# Patient Record
Sex: FEMALE | Race: WHITE | NOT HISPANIC OR LATINO | Employment: OTHER | URBAN - NONMETROPOLITAN AREA
[De-identification: names, ages, dates, MRNs, and addresses within clinical notes are randomized per-mention and may not be internally consistent; named-entity substitution may affect disease eponyms.]

---

## 2017-03-08 ENCOUNTER — TRANSCRIBE ORDERS (OUTPATIENT)
Dept: ADMINISTRATIVE | Facility: HOSPITAL | Age: 67
End: 2017-03-08

## 2017-03-08 DIAGNOSIS — Z12.31 ENCOUNTER FOR SCREENING MAMMOGRAM FOR MALIGNANT NEOPLASM OF BREAST: Primary | ICD-10-CM

## 2017-03-21 ENCOUNTER — HOSPITAL ENCOUNTER (OUTPATIENT)
Dept: MAMMOGRAPHY | Facility: HOSPITAL | Age: 67
Discharge: HOME | End: 2017-03-21
Attending: INTERNAL MEDICINE | Admitting: INTERNAL MEDICINE

## 2017-03-21 DIAGNOSIS — Z12.31 ENCOUNTER FOR SCREENING MAMMOGRAM FOR MALIGNANT NEOPLASM OF BREAST: ICD-10-CM

## 2017-03-21 PROCEDURE — 77063 BREAST TOMOSYNTHESIS BI: CPT

## 2017-03-21 PROCEDURE — G0202 SCR MAMMO BI INCL CAD: HCPCS

## 2018-01-23 ENCOUNTER — OFFICE VISIT (OUTPATIENT)
Dept: GASTROENTEROLOGY | Facility: CLINIC | Age: 68
End: 2018-01-23

## 2018-01-23 VITALS
HEART RATE: 74 BPM | OXYGEN SATURATION: 99 % | WEIGHT: 169 LBS | TEMPERATURE: 96.6 F | DIASTOLIC BLOOD PRESSURE: 88 MMHG | BODY MASS INDEX: 31.1 KG/M2 | HEIGHT: 62 IN | SYSTOLIC BLOOD PRESSURE: 174 MMHG

## 2018-01-23 DIAGNOSIS — Z80.0 FAMILY HISTORY OF COLON CANCER: ICD-10-CM

## 2018-01-23 DIAGNOSIS — R19.5 POSITIVE FIT (FECAL IMMUNOCHEMICAL TEST): ICD-10-CM

## 2018-01-23 DIAGNOSIS — R13.19 OTHER DYSPHAGIA: Primary | ICD-10-CM

## 2018-01-23 DIAGNOSIS — K21.9 GASTROESOPHAGEAL REFLUX DISEASE, ESOPHAGITIS PRESENCE NOT SPECIFIED: ICD-10-CM

## 2018-01-23 PROCEDURE — 99204 OFFICE O/P NEW MOD 45 MIN: CPT | Performed by: NURSE PRACTITIONER

## 2018-01-23 RX ORDER — AMITRIPTYLINE HYDROCHLORIDE 100 MG/1
TABLET, FILM COATED ORAL
COMMUNITY
Start: 2018-01-17

## 2018-01-23 RX ORDER — OMEPRAZOLE 20 MG/1
CAPSULE, DELAYED RELEASE ORAL
COMMUNITY
Start: 2017-11-25 | End: 2021-08-18

## 2018-01-23 RX ORDER — DIPHENHYDRAMINE HYDROCHLORIDE 25 MG/1
TABLET ORAL
COMMUNITY
End: 2021-08-18

## 2018-01-23 RX ORDER — FERROUS SULFATE 325(65) MG
325 TABLET ORAL
COMMUNITY
End: 2021-08-18

## 2018-01-23 RX ORDER — FENOFIBRATE 145 MG/1
TABLET, COATED ORAL
COMMUNITY
Start: 2017-11-25 | End: 2022-10-07

## 2018-01-23 RX ORDER — LABETALOL 300 MG/1
TABLET, FILM COATED ORAL
COMMUNITY
Start: 2017-10-21 | End: 2022-10-07

## 2018-01-23 RX ORDER — SODIUM, POTASSIUM,MAG SULFATES 17.5-3.13G
SOLUTION, RECONSTITUTED, ORAL ORAL
Qty: 1 BOTTLE | Refills: 0 | Status: SHIPPED | OUTPATIENT
Start: 2018-01-23 | End: 2018-03-07 | Stop reason: HOSPADM

## 2018-01-23 RX ORDER — ESTRADIOL 2 MG/1
2 TABLET ORAL
COMMUNITY
End: 2021-08-18

## 2018-01-23 RX ORDER — LEVOTHYROXINE SODIUM 88 UG/1
TABLET ORAL
COMMUNITY
Start: 2018-01-15

## 2018-01-23 RX ORDER — LOSARTAN POTASSIUM 100 MG/1
TABLET ORAL
COMMUNITY
Start: 2017-11-25 | End: 2021-08-18

## 2018-01-23 NOTE — PROGRESS NOTES
Chief Complaint   Patient presents with   • GI Problem     Patient is here today for a possitive FIT test. She states that sometimes she feels like her food is getting stuck       Subjective     HPI    Feeling of solid food becoming stuck in mid esophagus.  Taking a drink of water relieves sx or regurgitation of food.  She is unable to tell me how often or how long sx has been presnet.  Reports she experiences heartburn daily despite the use of bid Prilosec.      Positive FIT test through insurance.  No abdominal pain.  Increased diarrhea x 2 yrs since cholecystectomy.  No BRBPR or melena.  No weight loss.  Pos family hx of CRC in sister, dx age of 51.  No personal hx of colon polyps    CScope (Dr Last) 2013 normal  CScope (Dr Last) 2009 normal  CScope (Dr Last) 2006    Endoscopy (Dr Last) 2013-normal, neg celiac bx      Past Medical History:   Diagnosis Date   • DM (diabetes mellitus)    • GERD (gastroesophageal reflux disease)    • HTN (hypertension)    • Hypercholesteremia    • Hypothyroidism    • Osteopenia        Past Surgical History:   Procedure Laterality Date   • CARPAL TUNNEL RELEASE     • CHOLECYSTECTOMY     • COLONOSCOPY  11/08/2013   • TOTAL ABDOMINAL HYSTERECTOMY WITH SALPINGO OOPHORECTOMY     • UPPER GASTROINTESTINAL ENDOSCOPY  11/08/2013       Outpatient Prescriptions Marked as Taking for the 1/23/18 encounter (Office Visit) with ABRB Astudillo   Medication Sig Dispense Refill   • amitriptyline (ELAVIL) 100 MG tablet      • aspirin 81 MG tablet Take 81 mg by mouth.     • Biotin (BIOTIN 5000) 5 MG capsule Take  by mouth.     • CINNAMON PO Take  by mouth.     • estradiol (ESTRACE) 2 MG tablet Take 2 mg by mouth.     • fenofibrate (TRICOR) 145 MG tablet      • ferrous sulfate 325 (65 FE) MG tablet Take 325 mg by mouth Daily With Breakfast.     • Gelatin 600 MG capsule Take  by mouth.     • labetalol (NORMODYNE) 300 MG tablet      • levothyroxine (SYNTHROID, LEVOTHROID) 88 MCG tablet      •  "losartan (COZAAR) 100 MG tablet      • metFORMIN (GLUCOPHAGE) 1000 MG tablet      • omeprazole (priLOSEC) 20 MG capsule          No Known Allergies    Social History     Social History   • Marital status:      Spouse name: N/A   • Number of children: N/A   • Years of education: N/A     Occupational History   • Not on file.     Social History Main Topics   • Smoking status: Never Smoker   • Smokeless tobacco: Never Used   • Alcohol use No   • Drug use: Not on file   • Sexual activity: Not on file     Other Topics Concern   • Not on file     Social History Narrative       Family History   Problem Relation Age of Onset   • Breast cancer Maternal Aunt    • Colon cancer Sister        Review of Systems   Constitutional: Negative for fatigue, fever and unexpected weight change.   HENT: Negative for hearing loss, sore throat and voice change.    Eyes: Negative for visual disturbance.   Respiratory: Negative for cough, shortness of breath and wheezing.    Cardiovascular: Negative for chest pain and palpitations.   Gastrointestinal: Negative for abdominal pain, blood in stool and vomiting.   Endocrine: Negative for polydipsia and polyuria.   Genitourinary: Negative for difficulty urinating, dysuria, hematuria and urgency.   Musculoskeletal: Negative for joint swelling and myalgias.   Skin: Negative for color change, rash and wound.   Neurological: Negative for dizziness, tremors, seizures and syncope.   Hematological: Does not bruise/bleed easily.   Psychiatric/Behavioral: Negative for agitation and confusion. The patient is not nervous/anxious.        Objective     Vitals:    01/23/18 1247   BP: 174/88   Pulse: 74   Temp: 96.6 °F (35.9 °C)   SpO2: 99%   Weight: 76.7 kg (169 lb)   Height: 157.5 cm (62\")     Body mass index is 30.91 kg/(m^2).    Physical Exam   Constitutional: She is oriented to person, place, and time. She appears well-developed and well-nourished.   HENT:   Head: Normocephalic and atraumatic.   Eyes: " Conjunctivae are normal. Pupils are equal, round, and reactive to light. No scleral icterus.   Neck: No JVD present. No thyroid mass and no thyromegaly present.   Cardiovascular: Normal rate, regular rhythm and normal heart sounds.  Exam reveals no gallop and no friction rub.    No murmur heard.  Pulmonary/Chest: Effort normal and breath sounds normal. No accessory muscle usage. No respiratory distress. She has no wheezes. She has no rales.   Abdominal: Soft. Bowel sounds are normal. She exhibits no distension, no ascites and no mass. There is no splenomegaly or hepatomegaly. There is no tenderness. There is no rebound and no guarding.   Genitourinary:   Genitourinary Comments: Rectal-Did not examine   Musculoskeletal: Normal range of motion. She exhibits no edema.   Neurological: She is alert and oriented to person, place, and time.   Deemed a reliable historian, able to converse without difficulty and able to move all extremities without difficulty   Skin: Skin is warm and dry.   Psychiatric: She has a normal mood and affect. Her behavior is normal.       Imaging Results (most recent)     None          Assessment/Plan     Kathryn was seen today for gi problem.    Diagnoses and all orders for this visit:    Other dysphagia  -     Case Request; Standing  -     Implement Anesthesia Orders Day of Procedure; Standing  -     Obtain Informed Consent; Standing  -     Case Request    Gastroesophageal reflux disease, esophagitis presence not specified    Positive FIT (fecal immunochemical test)  -     sodium-potassium-magnesium sulfates (SUPREP BOWEL PREP KIT) 17.5-3.13-1.6 GM/180ML solution oral solution; Take as directed  -     Case Request; Standing  -     Implement Anesthesia Orders Day of Procedure; Standing  -     Obtain Informed Consent; Standing  -     Verify bowel prep was successful; Standing  -     Give tap water enema if bowel prep was insufficient; Standing  -     Give Fleet's enema for intolerance of bowel  prep; Standing  -     Case Request    Family history of colon cancer  Comments:  sister        COLONOSCOPY WITH ANESTHESIA (N/A), ESOPHAGOGASTRODUODENOSCOPY WITH ANESTHESIA (N/A)     Patient is to continue all blood pressure and cardiac medications prior to procedure.     Advised pt to stop ASA day prior to procedure and to stop use of NSAIDs, Fish Oil, and MV 5 days prior to procedure.  Tylenol based products are ok to take.  Pt verbalized understanding.    The risk of the endoscopy were discussed in detail.  We discussed the risk of perforation (one out of 3686-2438, riskier with dilation), bleeding (one out of 500), and the rare risks of infection, adverse reaction to anesthesia, respiratory failure, cardiac failure including MI and adverse reaction to medications, etc.  We discussed consequences that could occur if a risk were to develop such as the need for hospitalization, blood transfusion, surgical intervention, medications, pain and disability and death.  Alternatives include not doing anything, or pursuing an UGI series which only offers a diagnosis with potential less accuracy compared to egd.  The patient verbalizes understanding and agrees to proceed.    All risks, benefits, alternatives, and indications of colonoscopy procedure have been discussed with the patient. Risks to include perforation of the colon requiring possible surgery or colostomy, risk of bleeding from biopsies or removal of colon tissue, possibility of missing a colon polyp or cancer, or adverse drug reaction.  Benefits to include the diagnosis and management of disease of the colon and rectum. Alternatives to include barium enema, radiographic evaluation, lab testing or no intervention. Pt verbalizes understanding and agrees to proceed with procedure.        There are no Patient Instructions on file for this visit.

## 2018-03-07 ENCOUNTER — TELEPHONE (OUTPATIENT)
Dept: GASTROENTEROLOGY | Facility: CLINIC | Age: 68
End: 2018-03-07

## 2018-03-07 ENCOUNTER — HOSPITAL ENCOUNTER (OUTPATIENT)
Facility: HOSPITAL | Age: 68
Setting detail: HOSPITAL OUTPATIENT SURGERY
Discharge: HOME | End: 2018-03-07
Attending: INTERNAL MEDICINE | Admitting: INTERNAL MEDICINE

## 2018-03-07 ENCOUNTER — ANESTHESIA (OUTPATIENT)
Dept: GASTROENTEROLOGY | Facility: HOSPITAL | Age: 68
End: 2018-03-07

## 2018-03-07 ENCOUNTER — ANESTHESIA EVENT (OUTPATIENT)
Dept: GASTROENTEROLOGY | Facility: HOSPITAL | Age: 68
End: 2018-03-07

## 2018-03-07 VITALS
RESPIRATION RATE: 20 BRPM | BODY MASS INDEX: 30.55 KG/M2 | WEIGHT: 166 LBS | SYSTOLIC BLOOD PRESSURE: 163 MMHG | DIASTOLIC BLOOD PRESSURE: 84 MMHG | OXYGEN SATURATION: 98 % | HEART RATE: 86 BPM | HEIGHT: 62 IN | TEMPERATURE: 97.2 F

## 2018-03-07 DIAGNOSIS — R19.5 POSITIVE FIT (FECAL IMMUNOCHEMICAL TEST): ICD-10-CM

## 2018-03-07 DIAGNOSIS — R13.19 OTHER DYSPHAGIA: ICD-10-CM

## 2018-03-07 PROBLEM — Z80.0 FAMILY HISTORY OF COLON CANCER: Status: ACTIVE | Noted: 2018-03-07

## 2018-03-07 LAB — GLUCOSE BLDC GLUCOMTR-MCNC: 179 MG/DL (ref 70–130)

## 2018-03-07 PROCEDURE — 43249 ESOPH EGD DILATION <30 MM: CPT | Performed by: INTERNAL MEDICINE

## 2018-03-07 PROCEDURE — 45378 DIAGNOSTIC COLONOSCOPY: CPT | Performed by: INTERNAL MEDICINE

## 2018-03-07 PROCEDURE — 82962 GLUCOSE BLOOD TEST: CPT

## 2018-03-07 PROCEDURE — C1726 CATH, BAL DIL, NON-VASCULAR: HCPCS | Performed by: INTERNAL MEDICINE

## 2018-03-07 PROCEDURE — 25010000002 PROPOFOL 10 MG/ML EMULSION: Performed by: NURSE ANESTHETIST, CERTIFIED REGISTERED

## 2018-03-07 RX ORDER — SODIUM CHLORIDE 9 MG/ML
500 INJECTION, SOLUTION INTRAVENOUS CONTINUOUS PRN
Status: DISCONTINUED | OUTPATIENT
Start: 2018-03-07 | End: 2018-03-07 | Stop reason: HOSPADM

## 2018-03-07 RX ORDER — LIDOCAINE HYDROCHLORIDE 20 MG/ML
INJECTION, SOLUTION INFILTRATION; PERINEURAL AS NEEDED
Status: DISCONTINUED | OUTPATIENT
Start: 2018-03-07 | End: 2018-03-07 | Stop reason: SURG

## 2018-03-07 RX ORDER — SODIUM CHLORIDE 0.9 % (FLUSH) 0.9 %
3 SYRINGE (ML) INJECTION AS NEEDED
Status: DISCONTINUED | OUTPATIENT
Start: 2018-03-07 | End: 2018-03-07 | Stop reason: HOSPADM

## 2018-03-07 RX ORDER — PROPOFOL 10 MG/ML
VIAL (ML) INTRAVENOUS AS NEEDED
Status: DISCONTINUED | OUTPATIENT
Start: 2018-03-07 | End: 2018-03-07 | Stop reason: SURG

## 2018-03-07 RX ADMIN — PROPOFOL 30 MG: 10 INJECTION, EMULSION INTRAVENOUS at 07:55

## 2018-03-07 RX ADMIN — PROPOFOL 30 MG: 10 INJECTION, EMULSION INTRAVENOUS at 07:56

## 2018-03-07 RX ADMIN — PROPOFOL 30 MG: 10 INJECTION, EMULSION INTRAVENOUS at 08:05

## 2018-03-07 RX ADMIN — PROPOFOL 30 MG: 10 INJECTION, EMULSION INTRAVENOUS at 07:54

## 2018-03-07 RX ADMIN — PROPOFOL 30 MG: 10 INJECTION, EMULSION INTRAVENOUS at 07:52

## 2018-03-07 RX ADMIN — PROPOFOL 30 MG: 10 INJECTION, EMULSION INTRAVENOUS at 08:08

## 2018-03-07 RX ADMIN — PROPOFOL 50 MG: 10 INJECTION, EMULSION INTRAVENOUS at 07:45

## 2018-03-07 RX ADMIN — SODIUM CHLORIDE 500 ML: 9 INJECTION, SOLUTION INTRAVENOUS at 07:17

## 2018-03-07 RX ADMIN — PROPOFOL 30 MG: 10 INJECTION, EMULSION INTRAVENOUS at 07:46

## 2018-03-07 RX ADMIN — PROPOFOL 30 MG: 10 INJECTION, EMULSION INTRAVENOUS at 08:00

## 2018-03-07 RX ADMIN — PROPOFOL 30 MG: 10 INJECTION, EMULSION INTRAVENOUS at 08:03

## 2018-03-07 RX ADMIN — LIDOCAINE HYDROCHLORIDE 50 MG: 20 INJECTION, SOLUTION INFILTRATION; PERINEURAL at 07:45

## 2018-03-07 RX ADMIN — SODIUM CHLORIDE: 9 INJECTION, SOLUTION INTRAVENOUS at 08:03

## 2018-03-07 NOTE — ANESTHESIA POSTPROCEDURE EVALUATION
Patient: Kathryn Wray    Procedure Summary     Date Anesthesia Start Anesthesia Stop Room / Location    03/07/18 0742 [id number] [facility] ENDOSCOPY 5 / [facility]       Procedure Diagnosis Surgeon Provider    COLONOSCOPY WITH ANESTHESIA (N/A ); ESOPHAGOGASTRODUODENOSCOPY WITH ANESTHESIA (N/A Esophagus) Other dysphagia; Positive FIT (fecal immunochemical test)  (Other dysphagia [R13.19]; Positive FIT (fecal immunochemical test) [R19.5]) MD Africa Cordon CRNA          Anesthesia Type: general  Last vitals  BP   163/89 (03/07/18 0655)   Temp   97.2 °F (36.2 °C) (03/07/18 0655)   Pulse   94 (03/07/18 0655)   Resp   20 (03/07/18 0655)     SpO2   98 % (03/07/18 0655)     Post Anesthesia Care and Evaluation    Patient location during evaluation: PHASE II  Patient participation: complete - patient participated  Level of consciousness: awake and alert  Pain score: 0  Pain management: adequate  Airway patency: patent  Anesthetic complications: No anesthetic complications    Cardiovascular status: acceptable and stable  Respiratory status: acceptable and unassisted  Hydration status: stable

## 2018-03-07 NOTE — H&P
Chief Complaint:   Dysphagia and fit positive stool    Subjective     HPI:   Patient is having complaints of dysphagia.  She also was found to have fit positive stool.  Sister had colon cancer age 51.  Patient's last colonoscopy was November 2013.    Past Medical History:   Past Medical History:   Diagnosis Date   • Arthritis    • DM (diabetes mellitus)    • GERD (gastroesophageal reflux disease)    • HTN (hypertension)    • Hypercholesteremia    • Hypothyroidism    • Osteopenia        Past Surgical History:  Past Surgical History:   Procedure Laterality Date   • CARPAL TUNNEL RELEASE     • CHOLECYSTECTOMY     • COLONOSCOPY  11/08/2013   • TOTAL ABDOMINAL HYSTERECTOMY WITH SALPINGO OOPHORECTOMY     • UPPER GASTROINTESTINAL ENDOSCOPY  11/08/2013       Family History:  Family History   Problem Relation Age of Onset   • Breast cancer Maternal Aunt    • Colon cancer Sister        Social History:   reports that she has never smoked. She has never used smokeless tobacco. She reports that she does not drink alcohol.    Medications:   Prescriptions Prior to Admission   Medication Sig Dispense Refill Last Dose   • estradiol (ESTRACE) 2 MG tablet Take 2 mg by mouth.   3/6/2018 at Unknown time   • labetalol (NORMODYNE) 300 MG tablet    3/6/2018 at 1600   • levothyroxine (SYNTHROID, LEVOTHROID) 88 MCG tablet    3/6/2018 at Unknown time   • losartan (COZAAR) 100 MG tablet    3/6/2018 at Unknown time   • omeprazole (priLOSEC) 20 MG capsule    3/6/2018 at Unknown time   • amitriptyline (ELAVIL) 100 MG tablet    3/5/2018   • aspirin 81 MG tablet Take 81 mg by mouth.   3/5/2018   • Biotin (BIOTIN 5000) 5 MG capsule Take  by mouth.   More than a month at Unknown time   • CINNAMON PO Take  by mouth.   3/5/2018   • fenofibrate (TRICOR) 145 MG tablet    3/5/2018   • ferrous sulfate 325 (65 FE) MG tablet Take 325 mg by mouth Daily With Breakfast.   2/28/2018   • Gelatin 600 MG capsule Take  by mouth.   3/5/2018   • metFORMIN  "(GLUCOPHAGE) 1000 MG tablet    3/6/2018   • sodium-potassium-magnesium sulfates (SUPREP BOWEL PREP KIT) 17.5-3.13-1.6 GM/180ML solution oral solution Take as directed 1 bottle 0        Allergies:  Review of patient's allergies indicates no known allergies.    ROS:    General: Weight stable  Resp: No SOA  Cardiovascular: No CP      Objective     /89 (Patient Position: Sitting)  Pulse 94  Temp 97.2 °F (36.2 °C) (Temporal Artery )   Resp 20  Ht 157.5 cm (62\")  Wt 75.3 kg (166 lb)  SpO2 98%  BMI 30.36 kg/m2    Physical Exam   Constitutional: Pt is oriented to person, place, and in no distress.  Eyes: No icterus  ENMT: Unremarkable   Cardiovascular: Normal rate, regular rhythm.    Pulmonary/Chest: No distress.  No audible wheezes  Abdominal: Soft.   Skin: Skin is warm and dry.   Psychiatric: Mood, memory, affect and judgment appear normal.     Assessment/Plan     Diagnosis:  Dysphagia  Fit positive  Family history colon cancer in first-degree relative under the age of 60    Anticipated Surgical Procedure:  Endoscopy and colonoscopy    The risks, benefits, and alternatives of endoscopy were reviewed with the patient today.  Risks including perforation, with or without dilation, possibly requiring surgery.  Additional risks include risk of bleeding.  There is also the risk of a drug reaction or problems with anesthesia.  This will be discussed with the further by the anesthesia team on the day of the procedure. The benefits include the diagnosis and management of disease of the upper digestive tract.  Alternatives to endoscopy include upper GI series, laboratory testing, radiographic evaluation, or no intervention.  The patient verbalizes understanding and agrees.    The risks, benefits, and alternatives of colonoscopy were reviewed with the patient today.  Risks including perforation of the colon possibly requiring surgery or colostomy.  Additional risks include risk of bleeding from biopsies or removal of " colon tissue.  There is also the risk of a drug reaction or problems with anesthesia.  This will be discussed with the further by the anesthesia team on the day of the procedure.  Lastly there is a possibility of missing a colon polyp or cancer.  The benefits include the diagnosis and management of disease of the colon and rectum.  Alternatives to colonoscopy include barium enema, laboratory testing, radiographic evaluation, or no intervention.  The patient verbalizes understanding and agrees.    Much of this encounter note is an electronic transcription/translation of spoken language to printed text. The electronic translation of spoken language may permit erroneous, or at times, nonsensical words or phrases to be inadvertently transcribed; although I have reviewed the note for such errors, some may still exist.

## 2018-03-07 NOTE — PLAN OF CARE
Problem: Patient Care Overview (Adult)  Goal: Plan of Care Review  Outcome: Outcome(s) achieved Date Met: 03/07/18

## 2018-03-07 NOTE — PLAN OF CARE
Problem: Patient Care Overview (Adult)  Goal: Plan of Care Review  Outcome: Ongoing (interventions implemented as appropriate)   03/07/18 0803   Coping/Psychosocial Response Interventions   Plan Of Care Reviewed With patient   Patient Care Overview   Progress no change   Outcome Evaluation   Outcome Summary/Follow up Plan tolerating well       Problem: GI Endoscopy (Adult)  Goal: Signs and Symptoms of Listed Potential Problems Will be Absent or Manageable (GI Endoscopy)  Outcome: Ongoing (interventions implemented as appropriate)

## 2018-03-07 NOTE — ANESTHESIA PREPROCEDURE EVALUATION
Anesthesia Evaluation     Patient summary reviewed and Nursing notes reviewed   no history of anesthetic complications:  NPO Solid Status: > 8 hours  NPO Liquid Status: > 2 hours           Airway   Mallampati: II  TM distance: >3 FB  Neck ROM: full  Possible difficult intubation and Small opening  Dental      Pulmonary    (-) asthma, sleep apnea, not a smoker  Cardiovascular   Exercise tolerance: good (4-7 METS)    Patient on routine beta blocker and Beta blocker given within 24 hours of surgery    (+) hypertension, hyperlipidemia,       Neuro/Psych  (-) seizures, TIA, CVA  GI/Hepatic/Renal/Endo    (+) obesity,  GERD,  diabetes mellitus, hypothyroidism,   (-) liver disease, no renal disease    Musculoskeletal     Abdominal    Substance History      OB/GYN          Other   (+) arthritis                   Anesthesia Plan    ASA 2     general     intravenous induction   Anesthetic plan and risks discussed with patient.

## 2018-03-07 NOTE — PLAN OF CARE
Problem: GI Endoscopy (Adult)  Goal: Signs and Symptoms of Listed Potential Problems Will be Absent or Manageable (GI Endoscopy)  Outcome: Outcome(s) achieved Date Met: 03/07/18

## 2018-04-26 ENCOUNTER — TRANSCRIBE ORDERS (OUTPATIENT)
Dept: ADMINISTRATIVE | Facility: HOSPITAL | Age: 68
End: 2018-04-26

## 2018-04-26 DIAGNOSIS — Z12.31 ENCOUNTER FOR SCREENING MAMMOGRAM FOR MALIGNANT NEOPLASM OF BREAST: Primary | ICD-10-CM

## 2018-05-09 ENCOUNTER — HOSPITAL ENCOUNTER (OUTPATIENT)
Dept: MAMMOGRAPHY | Facility: HOSPITAL | Age: 68
Discharge: HOME | End: 2018-05-09
Attending: INTERNAL MEDICINE | Admitting: INTERNAL MEDICINE

## 2018-05-09 DIAGNOSIS — Z12.31 ENCOUNTER FOR SCREENING MAMMOGRAM FOR MALIGNANT NEOPLASM OF BREAST: ICD-10-CM

## 2018-05-09 PROCEDURE — 77063 BREAST TOMOSYNTHESIS BI: CPT

## 2018-05-09 PROCEDURE — 77067 SCR MAMMO BI INCL CAD: CPT

## 2018-05-16 ENCOUNTER — TRANSCRIBE ORDERS (OUTPATIENT)
Dept: GENERAL RADIOLOGY | Facility: HOSPITAL | Age: 68
End: 2018-05-16

## 2018-05-16 ENCOUNTER — HOSPITAL ENCOUNTER (OUTPATIENT)
Dept: GENERAL RADIOLOGY | Facility: HOSPITAL | Age: 68
Discharge: HOME | End: 2018-05-16
Admitting: NURSE PRACTITIONER

## 2018-05-16 DIAGNOSIS — Z78.0 POST-MENOPAUSAL: ICD-10-CM

## 2018-05-16 DIAGNOSIS — Z78.0 POST-MENOPAUSAL: Primary | ICD-10-CM

## 2018-05-16 PROCEDURE — 77080 DXA BONE DENSITY AXIAL: CPT

## 2019-08-13 ENCOUNTER — TRANSCRIBE ORDERS (OUTPATIENT)
Dept: GENERAL RADIOLOGY | Facility: HOSPITAL | Age: 69
End: 2019-08-13

## 2019-08-13 ENCOUNTER — HOSPITAL ENCOUNTER (OUTPATIENT)
Dept: GENERAL RADIOLOGY | Facility: HOSPITAL | Age: 69
Discharge: HOME | End: 2019-08-13
Admitting: INTERNAL MEDICINE

## 2019-08-13 DIAGNOSIS — S99.921A INJURY OF RIGHT FOOT, INITIAL ENCOUNTER: Primary | ICD-10-CM

## 2019-08-13 DIAGNOSIS — S99.921A INJURY OF RIGHT FOOT, INITIAL ENCOUNTER: ICD-10-CM

## 2019-08-13 PROCEDURE — 73630 X-RAY EXAM OF FOOT: CPT

## 2019-11-28 ENCOUNTER — APPOINTMENT (OUTPATIENT)
Dept: GENERAL RADIOLOGY | Age: 69
End: 2019-11-28
Payer: MEDICARE

## 2019-11-28 ENCOUNTER — HOSPITAL ENCOUNTER (EMERGENCY)
Age: 69
Discharge: HOME | End: 2019-11-28
Attending: EMERGENCY MEDICINE
Payer: MEDICARE

## 2019-11-28 ENCOUNTER — APPOINTMENT (OUTPATIENT)
Dept: CT IMAGING | Age: 69
End: 2019-11-28
Payer: MEDICARE

## 2019-11-28 VITALS
OXYGEN SATURATION: 93 % | TEMPERATURE: 101 F | DIASTOLIC BLOOD PRESSURE: 71 MMHG | BODY MASS INDEX: 29.81 KG/M2 | SYSTOLIC BLOOD PRESSURE: 167 MMHG | HEART RATE: 92 BPM | HEIGHT: 62 IN | WEIGHT: 162 LBS | RESPIRATION RATE: 20 BRPM

## 2019-11-28 DIAGNOSIS — B02.9 HERPES ZOSTER WITHOUT COMPLICATION: Primary | ICD-10-CM

## 2019-11-28 LAB
ALBUMIN SERPL-MCNC: 4.3 G/DL (ref 3.5–5.2)
ALP BLD-CCNC: 55 U/L (ref 35–104)
ALT SERPL-CCNC: 18 U/L (ref 5–33)
ANION GAP SERPL CALCULATED.3IONS-SCNC: 14 MMOL/L (ref 7–19)
AST SERPL-CCNC: 22 U/L (ref 5–32)
BACTERIA: NEGATIVE /HPF
BASOPHILS ABSOLUTE: 0 K/UL (ref 0–0.2)
BASOPHILS RELATIVE PERCENT: 0.4 % (ref 0–1)
BILIRUB SERPL-MCNC: <0.2 MG/DL (ref 0.2–1.2)
BILIRUBIN URINE: NEGATIVE
BLOOD, URINE: NEGATIVE
BUN BLDV-MCNC: 25 MG/DL (ref 8–23)
CALCIUM SERPL-MCNC: 10 MG/DL (ref 8.8–10.2)
CHLORIDE BLD-SCNC: 99 MMOL/L (ref 98–111)
CLARITY: CLEAR
CO2: 24 MMOL/L (ref 22–29)
COLOR: YELLOW
CREAT SERPL-MCNC: 1.5 MG/DL (ref 0.5–0.9)
EOSINOPHILS ABSOLUTE: 0.1 K/UL (ref 0–0.6)
EOSINOPHILS RELATIVE PERCENT: 1.4 % (ref 0–5)
EPITHELIAL CELLS, UA: 1 /HPF (ref 0–5)
GFR NON-AFRICAN AMERICAN: 34
GLUCOSE BLD-MCNC: 401 MG/DL (ref 74–109)
GLUCOSE URINE: >=1000 MG/DL
HCT VFR BLD CALC: 38.1 % (ref 37–47)
HEMOGLOBIN: 12.4 G/DL (ref 12–16)
HYALINE CASTS: 1 /HPF (ref 0–8)
IMMATURE GRANULOCYTES #: 0 K/UL
KETONES, URINE: NEGATIVE MG/DL
LACTIC ACID: 1.5 MMOL/L (ref 0.5–1.9)
LEUKOCYTE ESTERASE, URINE: NEGATIVE
LYMPHOCYTES ABSOLUTE: 1.8 K/UL (ref 1.1–4.5)
LYMPHOCYTES RELATIVE PERCENT: 24.8 % (ref 20–40)
MCH RBC QN AUTO: 28.8 PG (ref 27–31)
MCHC RBC AUTO-ENTMCNC: 32.5 G/DL (ref 33–37)
MCV RBC AUTO: 88.6 FL (ref 81–99)
MONOCYTES ABSOLUTE: 0.5 K/UL (ref 0–0.9)
MONOCYTES RELATIVE PERCENT: 6.5 % (ref 0–10)
NEUTROPHILS ABSOLUTE: 4.8 K/UL (ref 1.5–7.5)
NEUTROPHILS RELATIVE PERCENT: 66.6 % (ref 50–65)
NITRITE, URINE: NEGATIVE
PDW BLD-RTO: 12.6 % (ref 11.5–14.5)
PH UA: 7 (ref 5–8)
PLATELET # BLD: 223 K/UL (ref 130–400)
PMV BLD AUTO: 11.1 FL (ref 9.4–12.3)
POTASSIUM REFLEX MAGNESIUM: 3.9 MMOL/L (ref 3.5–5)
PROTEIN UA: 100 MG/DL
RAPID INFLUENZA  B AGN: NEGATIVE
RAPID INFLUENZA A AGN: NEGATIVE
RBC # BLD: 4.3 M/UL (ref 4.2–5.4)
RBC UA: 1 /HPF (ref 0–4)
SODIUM BLD-SCNC: 137 MMOL/L (ref 136–145)
SPECIFIC GRAVITY UA: 1.03 (ref 1–1.03)
TOTAL PROTEIN: 7.7 G/DL (ref 6.6–8.7)
TROPONIN: <0.01 NG/ML (ref 0–0.03)
URINE REFLEX TO CULTURE: ABNORMAL
UROBILINOGEN, URINE: 0.2 E.U./DL
WBC # BLD: 7.2 K/UL (ref 4.8–10.8)
WBC UA: 1 /HPF (ref 0–5)

## 2019-11-28 PROCEDURE — 2580000003 HC RX 258: Performed by: NURSE PRACTITIONER

## 2019-11-28 PROCEDURE — 71046 X-RAY EXAM CHEST 2 VIEWS: CPT

## 2019-11-28 PROCEDURE — 80053 COMPREHEN METABOLIC PANEL: CPT

## 2019-11-28 PROCEDURE — 83605 ASSAY OF LACTIC ACID: CPT

## 2019-11-28 PROCEDURE — 87804 INFLUENZA ASSAY W/OPTIC: CPT

## 2019-11-28 PROCEDURE — 93005 ELECTROCARDIOGRAM TRACING: CPT | Performed by: NURSE PRACTITIONER

## 2019-11-28 PROCEDURE — 36415 COLL VENOUS BLD VENIPUNCTURE: CPT

## 2019-11-28 PROCEDURE — 6370000000 HC RX 637 (ALT 250 FOR IP): Performed by: NURSE PRACTITIONER

## 2019-11-28 PROCEDURE — 96365 THER/PROPH/DIAG IV INF INIT: CPT

## 2019-11-28 PROCEDURE — 96375 TX/PRO/DX INJ NEW DRUG ADDON: CPT

## 2019-11-28 PROCEDURE — 85025 COMPLETE CBC W/AUTO DIFF WBC: CPT

## 2019-11-28 PROCEDURE — 2500000003 HC RX 250 WO HCPCS: Performed by: NURSE PRACTITIONER

## 2019-11-28 PROCEDURE — 84484 ASSAY OF TROPONIN QUANT: CPT

## 2019-11-28 PROCEDURE — 81001 URINALYSIS AUTO W/SCOPE: CPT

## 2019-11-28 PROCEDURE — 99284 EMERGENCY DEPT VISIT MOD MDM: CPT

## 2019-11-28 PROCEDURE — 6360000002 HC RX W HCPCS: Performed by: NURSE PRACTITIONER

## 2019-11-28 PROCEDURE — 87040 BLOOD CULTURE FOR BACTERIA: CPT

## 2019-11-28 PROCEDURE — 70450 CT HEAD/BRAIN W/O DYE: CPT

## 2019-11-28 RX ORDER — ACYCLOVIR 200 MG/1
800 CAPSULE ORAL
Qty: 140 CAPSULE | Refills: 0 | Status: SHIPPED | OUTPATIENT
Start: 2019-11-28 | End: 2019-12-05

## 2019-11-28 RX ORDER — TETRACAINE HYDROCHLORIDE 5 MG/ML
1 SOLUTION OPHTHALMIC ONCE
Status: COMPLETED | OUTPATIENT
Start: 2019-11-28 | End: 2019-11-28

## 2019-11-28 RX ORDER — LABETALOL 20 MG/4 ML (5 MG/ML) INTRAVENOUS SYRINGE
10 ONCE
Status: COMPLETED | OUTPATIENT
Start: 2019-11-28 | End: 2019-11-28

## 2019-11-28 RX ORDER — PROPARACAINE HYDROCHLORIDE 5 MG/ML
1 SOLUTION/ DROPS OPHTHALMIC ONCE
Status: DISCONTINUED | OUTPATIENT
Start: 2019-11-28 | End: 2019-11-28 | Stop reason: HOSPADM

## 2019-11-28 RX ORDER — 0.9 % SODIUM CHLORIDE 0.9 %
1000 INTRAVENOUS SOLUTION INTRAVENOUS ONCE
Status: DISCONTINUED | OUTPATIENT
Start: 2019-11-28 | End: 2019-11-28 | Stop reason: HOSPADM

## 2019-11-28 RX ORDER — METFORMIN HYDROCHLORIDE 500 MG/1
1000 TABLET, EXTENDED RELEASE ORAL ONCE
Status: COMPLETED | OUTPATIENT
Start: 2019-11-28 | End: 2019-11-28

## 2019-11-28 RX ORDER — PREDNISONE 20 MG/1
20 TABLET ORAL 2 TIMES DAILY
Qty: 10 TABLET | Refills: 0 | Status: SHIPPED | OUTPATIENT
Start: 2019-11-28 | End: 2019-12-03

## 2019-11-28 RX ORDER — GABAPENTIN 300 MG/1
300 CAPSULE ORAL 3 TIMES DAILY
Qty: 21 CAPSULE | Refills: 0 | Status: SHIPPED | OUTPATIENT
Start: 2019-11-28 | End: 2019-12-05

## 2019-11-28 RX ADMIN — TETRACAINE HYDROCHLORIDE 1 DROP: 5 SOLUTION OPHTHALMIC at 16:22

## 2019-11-28 RX ADMIN — METFORMIN HYDROCHLORIDE 1000 MG: 500 TABLET, EXTENDED RELEASE ORAL at 17:17

## 2019-11-28 RX ADMIN — ACYCLOVIR SODIUM 735 MG: 50 INJECTION, SOLUTION INTRAVENOUS at 16:22

## 2019-11-28 RX ADMIN — FLUORESCEIN SODIUM 1 MG: 1 STRIP OPHTHALMIC at 16:22

## 2019-11-28 RX ADMIN — LABETALOL 20 MG/4 ML (5 MG/ML) INTRAVENOUS SYRINGE 10 MG: at 16:22

## 2019-11-28 ASSESSMENT — ENCOUNTER SYMPTOMS
COUGH: 1
ABDOMINAL PAIN: 0

## 2019-11-29 LAB
EKG P AXIS: 48 DEGREES
EKG P-R INTERVAL: 150 MS
EKG Q-T INTERVAL: 368 MS
EKG QRS DURATION: 84 MS
EKG QTC CALCULATION (BAZETT): 434 MS
EKG T AXIS: 73 DEGREES

## 2019-11-29 PROCEDURE — 93010 ELECTROCARDIOGRAM REPORT: CPT | Performed by: INTERNAL MEDICINE

## 2019-12-03 LAB
BLOOD CULTURE, ROUTINE: NORMAL
CULTURE, BLOOD 2: NORMAL

## 2019-12-26 ENCOUNTER — TRANSCRIBE ORDERS (OUTPATIENT)
Dept: ADMINISTRATIVE | Facility: HOSPITAL | Age: 69
End: 2019-12-26

## 2019-12-26 DIAGNOSIS — Z12.31 VISIT FOR SCREENING MAMMOGRAM: Primary | ICD-10-CM

## 2019-12-31 ENCOUNTER — HOSPITAL ENCOUNTER (OUTPATIENT)
Dept: MAMMOGRAPHY | Facility: HOSPITAL | Age: 69
Discharge: HOME | End: 2019-12-31
Admitting: INTERNAL MEDICINE

## 2019-12-31 PROCEDURE — 77067 SCR MAMMO BI INCL CAD: CPT

## 2019-12-31 PROCEDURE — 77063 BREAST TOMOSYNTHESIS BI: CPT

## 2020-03-28 PROCEDURE — U0003 INFECTIOUS AGENT DETECTION BY NUCLEIC ACID (DNA OR RNA); SEVERE ACUTE RESPIRATORY SYNDROME CORONAVIRUS 2 (SARS-COV-2) (CORONAVIRUS DISEASE [COVID-19]), AMPLIFIED PROBE TECHNIQUE, MAKING USE OF HIGH THROUGHPUT TECHNOLOGIES AS DESCRIBED BY CMS-2020-01-R: HCPCS | Performed by: NURSE PRACTITIONER

## 2020-03-28 PROCEDURE — 87635 SARS-COV-2 COVID-19 AMP PRB: CPT | Performed by: NURSE PRACTITIONER

## 2020-04-03 ENCOUNTER — TELEPHONE (OUTPATIENT)
Dept: URGENT CARE | Facility: CLINIC | Age: 70
End: 2020-04-03

## 2020-04-03 NOTE — TELEPHONE ENCOUNTER
Notified patient of test results, see result note. She is feeling better but still has a low grade fever and cough. Continue quarantine due to possible false negative results of test. Follow up if symptoms worsen.

## 2020-07-01 ENCOUNTER — HOSPITAL ENCOUNTER (OUTPATIENT)
Dept: ULTRASOUND IMAGING | Facility: HOSPITAL | Age: 70
Discharge: HOME | End: 2020-07-01
Admitting: INTERNAL MEDICINE

## 2020-07-01 ENCOUNTER — TRANSCRIBE ORDERS (OUTPATIENT)
Dept: ADMINISTRATIVE | Facility: HOSPITAL | Age: 70
End: 2020-07-01

## 2020-07-01 DIAGNOSIS — R60.0 LOCALIZED EDEMA: Primary | ICD-10-CM

## 2020-07-01 PROCEDURE — 93971 EXTREMITY STUDY: CPT

## 2020-12-07 ENCOUNTER — TRANSCRIBE ORDERS (OUTPATIENT)
Dept: ADMINISTRATIVE | Facility: HOSPITAL | Age: 70
End: 2020-12-07

## 2020-12-07 DIAGNOSIS — Z12.31 ENCOUNTER FOR SCREENING MAMMOGRAM FOR MALIGNANT NEOPLASM OF BREAST: Primary | ICD-10-CM

## 2021-01-05 ENCOUNTER — APPOINTMENT (OUTPATIENT)
Dept: MAMMOGRAPHY | Facility: HOSPITAL | Age: 71
End: 2021-01-05

## 2021-01-06 ENCOUNTER — HOSPITAL ENCOUNTER (OUTPATIENT)
Dept: MAMMOGRAPHY | Facility: HOSPITAL | Age: 71
Discharge: HOME | End: 2021-01-06
Admitting: INTERNAL MEDICINE

## 2021-01-06 DIAGNOSIS — Z12.31 ENCOUNTER FOR SCREENING MAMMOGRAM FOR MALIGNANT NEOPLASM OF BREAST: ICD-10-CM

## 2021-01-06 PROCEDURE — 77063 BREAST TOMOSYNTHESIS BI: CPT

## 2021-01-06 PROCEDURE — 77067 SCR MAMMO BI INCL CAD: CPT

## 2021-05-03 ENCOUNTER — TRANSCRIBE ORDERS (OUTPATIENT)
Dept: ADMINISTRATIVE | Facility: HOSPITAL | Age: 71
End: 2021-05-03

## 2021-05-03 DIAGNOSIS — N18.4 CHRONIC KIDNEY DISEASE, STAGE IV (SEVERE) (HCC): Primary | ICD-10-CM

## 2021-07-22 ENCOUNTER — HOSPITAL ENCOUNTER (EMERGENCY)
Age: 71
Discharge: HOME | End: 2021-07-23
Attending: PEDIATRICS
Payer: MEDICARE

## 2021-07-22 DIAGNOSIS — E16.2 HYPOGLYCEMIA: Primary | ICD-10-CM

## 2021-07-22 DIAGNOSIS — N39.0 ACUTE URINARY TRACT INFECTION: ICD-10-CM

## 2021-07-22 LAB
BASOPHILS ABSOLUTE: 0 K/UL (ref 0–0.2)
BASOPHILS RELATIVE PERCENT: 0.5 % (ref 0–1)
EOSINOPHILS ABSOLUTE: 0.2 K/UL (ref 0–0.6)
EOSINOPHILS RELATIVE PERCENT: 2.3 % (ref 0–5)
GLUCOSE BLD-MCNC: 128 MG/DL (ref 70–99)
GLUCOSE BLD-MCNC: 214 MG/DL (ref 70–99)
HCT VFR BLD CALC: 37.2 % (ref 37–47)
HEMOGLOBIN: 12.1 G/DL (ref 12–16)
IMMATURE GRANULOCYTES #: 0 K/UL
LYMPHOCYTES ABSOLUTE: 2.1 K/UL (ref 1.1–4.5)
LYMPHOCYTES RELATIVE PERCENT: 25.9 % (ref 20–40)
MCH RBC QN AUTO: 29.2 PG (ref 27–31)
MCHC RBC AUTO-ENTMCNC: 32.5 G/DL (ref 33–37)
MCV RBC AUTO: 89.6 FL (ref 81–99)
MONOCYTES ABSOLUTE: 0.6 K/UL (ref 0–0.9)
MONOCYTES RELATIVE PERCENT: 7.6 % (ref 0–10)
NEUTROPHILS ABSOLUTE: 5 K/UL (ref 1.5–7.5)
NEUTROPHILS RELATIVE PERCENT: 63.6 % (ref 50–65)
PDW BLD-RTO: 13 % (ref 11.5–14.5)
PERFORMED ON: ABNORMAL
PERFORMED ON: ABNORMAL
PLATELET # BLD: 279 K/UL (ref 130–400)
PMV BLD AUTO: 11.2 FL (ref 9.4–12.3)
RBC # BLD: 4.15 M/UL (ref 4.2–5.4)
WBC # BLD: 7.9 K/UL (ref 4.8–10.8)

## 2021-07-22 PROCEDURE — 96361 HYDRATE IV INFUSION ADD-ON: CPT

## 2021-07-22 PROCEDURE — 82947 ASSAY GLUCOSE BLOOD QUANT: CPT

## 2021-07-22 PROCEDURE — 99283 EMERGENCY DEPT VISIT LOW MDM: CPT

## 2021-07-22 PROCEDURE — 96374 THER/PROPH/DIAG INJ IV PUSH: CPT

## 2021-07-23 VITALS
SYSTOLIC BLOOD PRESSURE: 177 MMHG | OXYGEN SATURATION: 94 % | HEART RATE: 100 BPM | BODY MASS INDEX: 30.36 KG/M2 | DIASTOLIC BLOOD PRESSURE: 79 MMHG | TEMPERATURE: 97.3 F | WEIGHT: 166 LBS | RESPIRATION RATE: 18 BRPM

## 2021-07-23 LAB
ALBUMIN SERPL-MCNC: 4.6 G/DL (ref 3.5–5.2)
ALP BLD-CCNC: 58 U/L (ref 35–104)
ALT SERPL-CCNC: 18 U/L (ref 5–33)
ANION GAP SERPL CALCULATED.3IONS-SCNC: 14 MMOL/L (ref 7–19)
AST SERPL-CCNC: 30 U/L (ref 5–32)
BACTERIA: NEGATIVE /HPF
BILIRUB SERPL-MCNC: 0.3 MG/DL (ref 0.2–1.2)
BILIRUBIN URINE: NEGATIVE
BLOOD, URINE: NEGATIVE
BUN BLDV-MCNC: 26 MG/DL (ref 8–23)
CALCIUM SERPL-MCNC: 10 MG/DL (ref 8.8–10.2)
CHLORIDE BLD-SCNC: 101 MMOL/L (ref 98–111)
CLARITY: CLEAR
CO2: 23 MMOL/L (ref 22–29)
COLOR: YELLOW
CREAT SERPL-MCNC: 1.6 MG/DL (ref 0.5–0.9)
CRYSTALS, UA: ABNORMAL /HPF
EPITHELIAL CELLS, UA: 2 /HPF (ref 0–5)
GFR AFRICAN AMERICAN: 38
GFR NON-AFRICAN AMERICAN: 32
GLUCOSE BLD-MCNC: 170 MG/DL (ref 70–99)
GLUCOSE BLD-MCNC: 250 MG/DL (ref 74–109)
GLUCOSE URINE: 500 MG/DL
HYALINE CASTS: 1 /HPF (ref 0–8)
KETONES, URINE: ABNORMAL MG/DL
LEUKOCYTE ESTERASE, URINE: ABNORMAL
NITRITE, URINE: NEGATIVE
PERFORMED ON: ABNORMAL
PH UA: 5.5 (ref 5–8)
POTASSIUM SERPL-SCNC: 3.8 MMOL/L (ref 3.5–5)
PROTEIN UA: 30 MG/DL
RBC UA: 1 /HPF (ref 0–4)
REASON FOR REJECTION: NORMAL
REJECTED TEST: NORMAL
SODIUM BLD-SCNC: 138 MMOL/L (ref 136–145)
SPECIFIC GRAVITY UA: 1.02 (ref 1–1.03)
TOTAL PROTEIN: 8 G/DL (ref 6.6–8.7)
UROBILINOGEN, URINE: 0.2 E.U./DL
WBC UA: 12 /HPF (ref 0–5)

## 2021-07-23 PROCEDURE — 6360000002 HC RX W HCPCS: Performed by: PEDIATRICS

## 2021-07-23 PROCEDURE — 87086 URINE CULTURE/COLONY COUNT: CPT

## 2021-07-23 PROCEDURE — 80053 COMPREHEN METABOLIC PANEL: CPT

## 2021-07-23 PROCEDURE — 2580000003 HC RX 258: Performed by: PEDIATRICS

## 2021-07-23 PROCEDURE — 81001 URINALYSIS AUTO W/SCOPE: CPT

## 2021-07-23 PROCEDURE — 36415 COLL VENOUS BLD VENIPUNCTURE: CPT

## 2021-07-23 PROCEDURE — 82947 ASSAY GLUCOSE BLOOD QUANT: CPT

## 2021-07-23 PROCEDURE — 85025 COMPLETE CBC W/AUTO DIFF WBC: CPT

## 2021-07-23 RX ORDER — 0.9 % SODIUM CHLORIDE 0.9 %
250 INTRAVENOUS SOLUTION INTRAVENOUS ONCE
Status: COMPLETED | OUTPATIENT
Start: 2021-07-23 | End: 2021-07-23

## 2021-07-23 RX ORDER — CEPHALEXIN 500 MG/1
500 CAPSULE ORAL 2 TIMES DAILY
Qty: 14 CAPSULE | Refills: 0 | Status: SHIPPED | OUTPATIENT
Start: 2021-07-23 | End: 2021-07-30

## 2021-07-23 RX ADMIN — SODIUM CHLORIDE 250 ML: 9 INJECTION, SOLUTION INTRAVENOUS at 02:17

## 2021-07-23 RX ADMIN — CEFTRIAXONE 1000 MG: 1 INJECTION, POWDER, FOR SOLUTION INTRAMUSCULAR; INTRAVENOUS at 03:30

## 2021-07-25 LAB — URINE CULTURE, ROUTINE: NORMAL

## 2021-07-30 ASSESSMENT — ENCOUNTER SYMPTOMS
BACK PAIN: 0
DIARRHEA: 1
RHINORRHEA: 0
COUGH: 0
NAUSEA: 1
VOMITING: 0
SHORTNESS OF BREATH: 0
EYE DISCHARGE: 0
ABDOMINAL PAIN: 0
COLOR CHANGE: 0

## 2021-07-31 NOTE — ED PROVIDER NOTES
[facility] EMERGENCY DEPT  eMERGENCY dEPARTMENT eNCOUnter      Pt Name: Soham Hobson  MRN: [id number]  [place] 1950  Date of evaluation: 7/22/2021  Provider: Laila Frances MD    [address] [place] [address]       Chief Complaint   Patient presents with    Hypoglycemia         HISTORY OF PRESENT ILLNESS   (Location/Symptom, Timing/Onset,Context/Setting, Quality, Duration, Modifying Factors, Severity)  Note limiting factors. Soham Hobson is a 66 y.o. female who presents to the emergency department with hypoglycemia. Patient states that her Accu-Chek was 69 at 4:30 PM.  She ate a candy bar and called the doctor's office who told her to recheck her sugar and it was 128. Patient states she took her evening Lantus at 30 units just after noon \"because my sugars were going up. \"  Patient has been taken off her short acting insulin. Patient states she experienced nausea without vomiting. Patient has also been having a low-grade fever and diarrhea. Patient denies cough, congestion, difficulty breathing, chest pain, palpitations, difficulty urinating, burning with urination, or syncope. HPI    NursingNotes were reviewed. REVIEW OF SYSTEMS    (2-9 systems for level 4, 10 or more for level 5)     Review of Systems   Constitutional: Positive for fatigue and fever. Negative for diaphoresis. HENT: Negative for congestion and rhinorrhea. Eyes: Negative for discharge. Respiratory: Negative for cough and shortness of breath. Cardiovascular: Negative for chest pain and palpitations. Gastrointestinal: Positive for diarrhea and nausea. Negative for abdominal pain and vomiting. Genitourinary: Negative for difficulty urinating and dysuria. Musculoskeletal: Negative for back pain and neck pain. Skin: Negative for color change and pallor. Neurological: Negative for syncope and light-headedness. Psychiatric/Behavioral: Negative for agitation and confusion. All other systems reviewed and are negative. PAST MEDICALHISTORY     Past Medical History:   Diagnosis Date    Diabetes mellitus ([address]     Hyperlipidemia     Hypertension          SURGICAL HISTORY       Past Surgical History:   Procedure Laterality Date    CARPAL TUNNEL RELEASE Left 5/6/2016    CARPAL TUNNEL RELEASE AND EXCISION OF GANGLION CYST  performed by Meme Alonso MD at [address] [facility], [place]  Jan 2016    HYSTERECTOMY           CURRENT MEDICATIONS     Discharge Medication List as of 7/23/2021  3:34 AM      CONTINUE these medications which have NOT CHANGED    Details   gabapentin (NEURONTIN) 300 MG capsule Take 1 capsule by mouth 3 times daily for 7 days. , Disp-21 capsule, R-0Print      HYDROcodone-acetaminophen (NORCO) 5-325 MG per tablet One or two every to four to six hours prn pain, Disp-40 tablet, R-0Print      metFORMIN (GLUCOPHAGE) 1000 MG tablet Take 1,000 mg by mouth 2 times daily (with meals)      losartan (COZAAR) 100 MG tablet Take 100 mg by mouth daily      labetalol (NORMODYNE) 300 MG tablet Take 300 mg by mouth 2 times daily      omeprazole (PRILOSEC) 20 MG capsule Take 20 mg by mouth daily      Levothyroxine Sodium 50 MCG CAPS Take by mouth Daily      levothyroxine (SYNTHROID) 100 MCG injection Infuse 0.05 mcg intravenously every morning (before breakfast)      fenofibrate (TRICOR) 145 MG tablet Take 145 mg by mouth daily      amitriptyline (ELAVIL) 100 MG tablet Take 100 mg by mouth nightly      aspirin 81 MG tablet Take 81 mg by mouth daily             ALLERGIES     Patient has no known allergies. FAMILY HISTORY     No family history on file.        SOCIAL HISTORY       Social History     Socioeconomic History    Marital status:      Spouse name: Not on file    Number of children: Not on file    Years of education: Not on file    Highest education level: Not on file   Occupational History    Not on file   Tobacco Use    Smoking status: Never Smoker    Smokeless tobacco: Never Used   Substance and Sexual Activity    Alcohol use: No    Drug use: Never    Sexual activity: Not on file   Other Topics Concern    Not on file   Social History Narrative    Not on file     Social Determinants of Health     Financial Resource Strain:     Difficulty of Paying Living Expenses:    Food Insecurity:     Worried About Running Out of Food in the Last Year:     [address] in the Last Year:    Transportation Needs:     Lack of Transportation (Medical):  Lack of Transportation (Non-Medical):    Physical Activity:     Days of Exercise per Week:     Minutes of Exercise per Session:    Stress:     Feeling of Stress :    Social Connections:     Frequency of Communication with Friends and Family:     Frequency of Social Gatherings with Friends and Family:     Attends [employer] Services:     Active Member of Clubs or Organizations:     Attends Club or Organization Meetings:     Marital Status:    Intimate Partner Violence:     Fear of Current or Ex-Partner:     Emotionally Abused:     Physically Abused:     Sexually Abused:        SCREENINGS    [place] Coma Scale  Eye Opening: Spontaneous  Best Verbal Response: Oriented  Best Motor Response: Obeys commands  [place] Coma Scale Score: 15        PHYSICAL EXAM    (up to 7 for level 4, 8 or more for level 5)     ED Triage Vitals [07/22/21 1715]   BP Temp Temp src Pulse Resp SpO2 Height Weight   (!) 170/83 97.3 °F (36.3 °C) -- 100 18 98 % -- 166 lb (75.3 kg)       Physical Exam  Vitals and nursing note reviewed. Constitutional:       General: She is not in acute distress. Appearance: Normal appearance. HENT:      Head: Normocephalic and atraumatic. Right Ear: External ear normal.      Left Ear: External ear normal.      Nose: Nose normal.      Mouth/Throat:      Mouth: Mucous membranes are moist.      Pharynx: Oropharynx is clear. No oropharyngeal exudate. Eyes:      General: No scleral icterus.      Conjunctiva/sclera: Conjunctivae normal.      Pupils: Pupils are equal, round, and reactive to light. Cardiovascular:      Rate and Rhythm: Normal rate and regular rhythm. Pulses: Normal pulses. Heart sounds: Normal heart sounds. Pulmonary:      Effort: Pulmonary effort is normal.      Breath sounds: Normal breath sounds. Abdominal:      General: Bowel sounds are normal.      Palpations: Abdomen is soft. Tenderness: There is no abdominal tenderness. There is no guarding. Musculoskeletal:         General: No tenderness or deformity. Cervical back: Neck supple. No rigidity. Skin:     General: Skin is warm and dry. Capillary Refill: Capillary refill takes less than 2 seconds. Coloration: Skin is not jaundiced. Neurological:      General: No focal deficit present. Mental Status: She is alert and oriented to person, place, and time. Mental status is at baseline.       Coordination: Coordination normal.   Psychiatric:         Mood and Affect: Mood normal.         Behavior: Behavior normal.         DIAGNOSTIC RESULTS     RADIOLOGY:  Non-plain film images such as CT, Ultrasound and MRI are read by the radiologist. Plain radiographic images are visualized and preliminarily interpreted bythe emergency physician with the below findings:          No orders to display           LABS:  Labs Reviewed   CBC WITH AUTO DIFFERENTIAL - Abnormal; Notable for the following components:       Result Value    RBC 4.15 (*)     MCHC 32.5 (*)     All other components within normal limits   COMPREHENSIVE METABOLIC PANEL - Abnormal; Notable for the following components:    Glucose 250 (*)     BUN 26 (*)     CREATININE 1.6 (*)     GFR Non- 32 (*)     GFR  38 (*)     All other components within normal limits   URINE RT REFLEX TO CULTURE - Abnormal; Notable for the following components:    Glucose, Ur 500 (*)     Ketones, Urine TRACE (*)     Protein, UA 30 (*)     Leukocyte Esterase, Urine MODERATE (*)     All other components within normal limits    Narrative:     please recollect   MICROSCOPIC URINALYSIS - Abnormal; Notable for the following components:    Bacteria, UA NEGATIVE (*)     Crystals, UA NEG (*)     WBC, UA 12 (*)     All other components within normal limits    Narrative:     please recollect   POCT GLUCOSE - Abnormal; Notable for the following components:    POC Glucose 128 (*)     All other components within normal limits   POCT GLUCOSE - Abnormal; Notable for the following components:    POC Glucose 214 (*)     All other components within normal limits   POCT GLUCOSE - Abnormal; Notable for the following components:    POC Glucose 170 (*)     All other components within normal limits   CULTURE, URINE    Narrative:     ORDER#: [id number]                          ORDERED BY: JOSEY AYALA  SOURCE: Urine Clean Catch                  COLLECTED:  07/23/21 02:27  ANTIBIOTICS AT ORLY.:                      RECEIVED :  07/23/21 02:32  please recollect   SPECIMEN REJECTION   POCT GLUCOSE   POCT GLUCOSE       All other labs were within normal range or not returned as of this dictation. EMERGENCY DEPARTMENT COURSE and DIFFERENTIAL DIAGNOSIS/MDM:   Vitals:    Vitals:    07/23/21 0100 07/23/21 0130 07/23/21 0200 07/23/21 0330   BP: (!) 168/77 (!) 174/80 (!) 162/73 (!) 177/79   Pulse:       Resp:       Temp:       SpO2: 93% 91% 95% 94%   Weight:           MDM  22-year-old female presents with history of low blood sugar. Patient has been shifted to Lantus only-evening dose. Patient took her dose early today approximately noon. Patient has not been eating appropriately. Patient states \"I am just not hungry. \"  Patient has been encouraged to eat small frequent meals throughout the day. Patient is to take Lantus insulin in the evening as prescribed at the same time every evening. Patient will keep documentation of her Accu-Cheks and when she takes her insulin.   It has been encouraged that patient be consistent daily. Patient will return with nausea, vomiting, sweatiness, passing out, or other concerns. Patient also diagnosed with acute urinary tract infection and started on Keflex. Patient will follow up with Dr. Brain Dodd, PCP.      CONSULTS:  None    PROCEDURES:  Unless otherwise noted below, none     Procedures    FINAL IMPRESSION      1. Hypoglycemia    2. Acute urinary tract infection          DISPOSITION/PLAN   DISPOSITION Decision To Discharge 07/23/2021 03:33:10 AM      PATIENT REFERRED TO:  Betty Maloney, DO  [address] [facility] [place] [address] [facility] [address]  [phone]    Schedule an appointment as soon as possible for a visit   Call later today for further instructions guarding insulin.       DISCHARGE MEDICATIONS:  Discharge Medication List as of 7/23/2021  3:34 AM      START taking these medications    Details   cephALEXin (KEFLEX) 500 MG capsule Take 1 capsule by mouth 2 times daily for 7 days, Disp-14 capsule, R-0Normal                (Please note that portions of this note were completed with a voice recognition program.  Efforts were made to edit thedictations but occasionally words are mis-transcribed.)    Anne Palomino MD (electronically signed)  Attending Emergency Physician          Anne Palomino MD  07/30/21 [id number]

## 2021-08-18 ENCOUNTER — TELEPHONE (OUTPATIENT)
Dept: ENDOCRINOLOGY | Facility: CLINIC | Age: 71
End: 2021-08-18

## 2021-08-18 ENCOUNTER — OFFICE VISIT (OUTPATIENT)
Dept: ENDOCRINOLOGY | Facility: CLINIC | Age: 71
End: 2021-08-18

## 2021-08-18 VITALS
BODY MASS INDEX: 28.34 KG/M2 | OXYGEN SATURATION: 98 % | WEIGHT: 154 LBS | HEIGHT: 62 IN | SYSTOLIC BLOOD PRESSURE: 100 MMHG | HEART RATE: 76 BPM | DIASTOLIC BLOOD PRESSURE: 60 MMHG

## 2021-08-18 DIAGNOSIS — I10 ESSENTIAL HYPERTENSION: ICD-10-CM

## 2021-08-18 DIAGNOSIS — E78.2 MIXED HYPERLIPIDEMIA: ICD-10-CM

## 2021-08-18 DIAGNOSIS — E11.649 TYPE 2 DIABETES MELLITUS WITH HYPOGLYCEMIA WITHOUT COMA, WITH LONG-TERM CURRENT USE OF INSULIN (HCC): Primary | ICD-10-CM

## 2021-08-18 DIAGNOSIS — Z79.4 TYPE 2 DIABETES MELLITUS WITH HYPOGLYCEMIA WITHOUT COMA, WITH LONG-TERM CURRENT USE OF INSULIN (HCC): Primary | ICD-10-CM

## 2021-08-18 DIAGNOSIS — E03.9 ACQUIRED HYPOTHYROIDISM: ICD-10-CM

## 2021-08-18 PROCEDURE — 99214 OFFICE O/P EST MOD 30 MIN: CPT | Performed by: NURSE PRACTITIONER

## 2021-08-18 RX ORDER — HYDROCHLOROTHIAZIDE 12.5 MG/1
12.5 TABLET ORAL DAILY
COMMUNITY

## 2021-08-18 RX ORDER — FAMOTIDINE 40 MG/1
40 TABLET, FILM COATED ORAL DAILY
COMMUNITY
End: 2022-10-07

## 2021-08-18 RX ORDER — IRBESARTAN 300 MG/1
300 TABLET ORAL NIGHTLY
COMMUNITY

## 2021-08-18 RX ORDER — INSULIN GLARGINE 100 [IU]/ML
35 INJECTION, SOLUTION SUBCUTANEOUS DAILY
COMMUNITY
End: 2022-05-17 | Stop reason: SDUPTHER

## 2021-08-18 RX ORDER — ERGOCALCIFEROL 1.25 MG/1
50000 CAPSULE ORAL DAILY
COMMUNITY

## 2021-08-18 RX ORDER — GLUCAGON INJECTION, SOLUTION 1 MG/.2ML
1 INJECTION, SOLUTION SUBCUTANEOUS AS NEEDED
Qty: 0.4 ML | Refills: 11 | Status: SHIPPED | OUTPATIENT
Start: 2021-08-18 | End: 2021-08-19

## 2021-08-18 NOTE — PROGRESS NOTES
"Chief Complaint  Diabetes and Thyroid Problem    Subjective          Kathryn Wray presents to [facility] ENDOCRINOLOGY  History of Present Illness       In office visit       Referring provider Dk Tom DO         Chief Complaint   Patient presents with   • Diabetes   • Thyroid Problem       HPI      71 year old female presents for consultation       Reason -- diabetes mellitus type 2     Duration---diagnosed at age 38     Context routine lab work     Timing constant     Quality  Not controlled     June 2021    HgbA1c 10.2%     Severity -high     Macrovascular complications---none     Microvascular complications ---neuropathy, and renal disease     Current diabetes regimen     Oral medications, insulin     Current glucose monitoring         Checking 4 times daily     Has the DEXCOM G6     Could not download       In office 125       She does have history of low sugars    States sugars are variable from 50 up to 300        Review of Systems - General ROS: negative        Objective   Vital Signs:   /60   Pulse 76   Ht 157.5 cm (62\")   Wt 69.9 kg (154 lb)   SpO2 98%   BMI 28.17 kg/m²     Physical Exam  Constitutional:       Appearance: Normal appearance.   Cardiovascular:      Rate and Rhythm: Regular rhythm.      Heart sounds: Normal heart sounds.   Pulmonary:      Breath sounds: Normal breath sounds.   Musculoskeletal:      Cervical back: Normal range of motion.      Comments: In wheelchair    Neurological:      Mental Status: She is alert.        Result Review :   The following data was reviewed by: BARB Trotter on 08/18/2021:  Common labs    Common Labsle 7/23/21 7/23/21    0045 0045   Glucose  250 (A)   BUN  26 (A)   Creatinine  1.6 (A)   eGFR Non  Am  32 (A)   eGFR African Am  38 (A)   Sodium  138   Potassium  3.8   Chloride  101   Calcium  10.0   Albumin  4.6   Total Bilirubin  0.3   Alkaline Phosphatase  58   AST (SGOT)  30   ALT (SGPT)  18   WBC 7.9 "    Hemoglobin 12.1    Hematocrit 37.2    Platelets 279    (A) Abnormal value       Comments are available for some flowsheets but are not being displayed.                     Assessment and Plan    Diagnoses and all orders for this visit:    1. Type 2 diabetes mellitus with hypoglycemia without coma, with long-term current use of insulin (CMS/[facility]) (Primary)    2. Essential hypertension    3. Acquired hypothyroidism    4. Mixed hyperlipidemia    Other orders  -     Glucagon (Gvoke PFS) 1 MG/0.2ML solution prefilled syringe; Inject 1 mg under the skin into the appropriate area as directed As Needed (Hypoglycemia) for up to 1 day.  Dispense: 0.4 mL; Refill: 11                 Glycemic Management:      Diabetes mellitus type 2     June 2021    HgbA1c 10.2%           Taking glipizide 10 mg one daily --stop today     Taking Lantus 35 units daytime --decreased to 25 units once a day    Start Trulicity 0.75 mg once weekly    Side effects can include nausea, if nausea occurs while you are eating stop eating the medicine is telling you you are full    If you have vomiting and abdominal pain stop medication and call the office        Sliding scale Humalog    1 unit for every 50 above 150    Plan on adding Farxiga next appointment      Goals for sugar    Fasting and before meals 80 to 130    2 hours after meals 180 or less      Aim for 45 grams of carbohydrate per meal    Aim for 15 grams of carbohydrate per snack           Called in GVOKE         Microvascular Complications Monitoring       Last eye exam--Dec. 2021 , no DR     Neuropathy --mild       Lipid Management:       Taking tricor 145 mg daily           Blood Pressure Management:      Taking labetalol 300 mg     Taking losartan 100 mg daily     Taking Norvasc 5 mg daily         Thyroid Health    Hypothyroidism     Taking levothyroxine 88 mcg daily       June 2021     TSH - 3     Bone Health       Taking vitamin d supplement         Weight Management:      Patient's Body  mass index is 28.17 kg/m². indicating that she is overweight (BMI 25-29.9). Obesity-related health conditions include the following: diabetes mellitus. Obesity is unchanged. BMI is is above average; no BMI management plan is appropriate. We discussed portion control and increasing exercise..        Preventive Care:     Non smoker         Records received from Dr. Tom from 2021 and reviewed thank you for this consultation        Follow Up   Return in 2 weeks (on 9/1/2021) for Recheck.  Patient was given instructions and counseling regarding her condition or for health maintenance advice. Please see specific information pulled into the AVS if appropriate.         This document has been electronically signed by BARB Trotter on August 18, 2021 14:00 CDT.

## 2021-08-18 NOTE — TELEPHONE ENCOUNTER
Kathryn was in today 8.18.21 and she was wanting a call back. She wanted to know what to do if her sugar levels start to rise.    [phone]

## 2021-08-18 NOTE — PATIENT INSTRUCTIONS
Taking glipizide 10 mg one daily --stop today     Taking Lantus 35 units daytime --decreased to 25 units once a day    Start Trulicity 0.75 mg once weekly    Side effects can include nausea, if nausea occurs while you are eating stop eating the medicine is telling you you are full    If you have vomiting and abdominal pain stop medication and call the office              Goals for sugar    Fasting and before meals 80 to 130    2 hours after meals 180 or less      Aim for 45 grams of carbohydrate per meal    Aim for 15 grams of carbohydrate per snack

## 2021-08-19 ENCOUNTER — TELEPHONE (OUTPATIENT)
Dept: ENDOCRINOLOGY | Facility: CLINIC | Age: 71
End: 2021-08-19

## 2021-08-19 RX ORDER — INSULIN ASPART 100 [IU]/ML
INJECTION, SOLUTION INTRAVENOUS; SUBCUTANEOUS
Qty: 3 PEN | Refills: 11 | Status: SHIPPED | OUTPATIENT
Start: 2021-08-19 | End: 2022-04-11 | Stop reason: SDUPTHER

## 2021-08-19 NOTE — TELEPHONE ENCOUNTER
[facility] [address] [facility], [place] - [address] [facility] - [phone]  - [phone] FX      Left a vm stating that she had a question on a script sent in for this pt

## 2021-08-19 NOTE — TELEPHONE ENCOUNTER
Pt stated she has Novolog at home she thinks  Provided pt with SS of 1:50 above 150  Script please

## 2021-08-20 ENCOUNTER — TELEPHONE (OUTPATIENT)
Dept: ENDOCRINOLOGY | Facility: CLINIC | Age: 71
End: 2021-08-20

## 2021-08-20 NOTE — TELEPHONE ENCOUNTER
Pt left vm needs you to call about Novalog again was in [facility] and needs the numbers and also needs pens insulin called in with 3 mo supply to [facility]     [phone]

## 2021-08-23 RX ORDER — PEN NEEDLE, DIABETIC 30 GX3/16"
1 NEEDLE, DISPOSABLE MISCELLANEOUS 4 TIMES DAILY
Qty: 360 EACH | Refills: 3 | Status: SHIPPED | OUTPATIENT
Start: 2021-08-23

## 2021-08-23 RX ORDER — INSULIN ASPART 100 [IU]/ML
INJECTION, SOLUTION INTRAVENOUS; SUBCUTANEOUS
Qty: 12 PEN | Refills: 3 | Status: SHIPPED | OUTPATIENT
Start: 2021-08-23 | End: 2022-04-19

## 2021-08-23 NOTE — TELEPHONE ENCOUNTER
I sent NovoLog to [facility] and then I sent to [facility] today    Deirdre talk to her Friday and gave her the sliding scale to use she wrote it down while she was talking to Deirdre    Please find out if she is lost this and will call her back with the fatuma and [facility] [place]

## 2021-08-24 ENCOUNTER — TELEPHONE (OUTPATIENT)
Dept: ENDOCRINOLOGY | Facility: CLINIC | Age: 71
End: 2021-08-24

## 2021-08-24 NOTE — TELEPHONE ENCOUNTER
Spoke to pt - she is aware to contact pcp or cardiologist. Pt also requested script for Gvoke 3 month supply to [facility].

## 2021-08-24 NOTE — TELEPHONE ENCOUNTER
Pt left a vm stating that she had called yesterday, she was really dizzy, and she was dizzy this morning, her blood pressure is 89/52, pulse is 76, and she is wondering what she needs to do. She doesn't know if this is from her blood pressure medicine, but she siad she wishes Rafael would check into it and call her back.     She said her grand daughter set something up on her phone for her dexcom and if it didn't let her know, she would give her some more information.     It looks like pt did speak with the nurse yesterday.

## 2021-08-25 RX ORDER — GLUCAGON INJECTION, SOLUTION 1 MG/.2ML
1 INJECTION, SOLUTION SUBCUTANEOUS AS NEEDED
Qty: 12 ML | Refills: 3 | Status: SHIPPED | OUTPATIENT
Start: 2021-08-25 | End: 2021-08-26

## 2021-08-26 ENCOUNTER — TELEPHONE (OUTPATIENT)
Dept: ENDOCRINOLOGY | Facility: CLINIC | Age: 71
End: 2021-08-26

## 2021-08-26 NOTE — TELEPHONE ENCOUNTER
[facility] wants to clarify Glucagon quantity     Please call them at [phone] reference [id number]

## 2021-09-01 ENCOUNTER — OFFICE VISIT (OUTPATIENT)
Dept: ENDOCRINOLOGY | Facility: CLINIC | Age: 71
End: 2021-09-01

## 2021-09-01 VITALS
DIASTOLIC BLOOD PRESSURE: 76 MMHG | HEIGHT: 62 IN | HEART RATE: 81 BPM | OXYGEN SATURATION: 99 % | BODY MASS INDEX: 28.34 KG/M2 | WEIGHT: 154 LBS | SYSTOLIC BLOOD PRESSURE: 120 MMHG

## 2021-09-01 DIAGNOSIS — E55.9 VITAMIN D DEFICIENCY: ICD-10-CM

## 2021-09-01 DIAGNOSIS — Z79.4 TYPE 2 DIABETES MELLITUS WITH HYPOGLYCEMIA WITHOUT COMA, WITH LONG-TERM CURRENT USE OF INSULIN (HCC): Primary | ICD-10-CM

## 2021-09-01 DIAGNOSIS — E11.649 TYPE 2 DIABETES MELLITUS WITH HYPOGLYCEMIA WITHOUT COMA, WITH LONG-TERM CURRENT USE OF INSULIN (HCC): Primary | ICD-10-CM

## 2021-09-01 DIAGNOSIS — I10 ESSENTIAL HYPERTENSION: ICD-10-CM

## 2021-09-01 DIAGNOSIS — E03.9 ACQUIRED HYPOTHYROIDISM: ICD-10-CM

## 2021-09-01 DIAGNOSIS — E78.2 MIXED HYPERLIPIDEMIA: ICD-10-CM

## 2021-09-01 PROCEDURE — 99214 OFFICE O/P EST MOD 30 MIN: CPT | Performed by: NURSE PRACTITIONER

## 2021-09-01 PROCEDURE — 95251 CONT GLUC MNTR ANALYSIS I&R: CPT | Performed by: NURSE PRACTITIONER

## 2021-09-01 NOTE — PROGRESS NOTES
"Chief Complaint  Diabetes (2 week )    Subjective          Kathryn Wray presents to [facility] ENDOCRINOLOGY  History of Present Illness     In office visit    Primary care provider Dr. Tom    71-year-old female presents for follow-up    Reason diabetes mellitus type 2    Timing is constant    Quality improved control    Duration diagnosed at age 38    Context diagnosed through routine lab work    June 2021 hemoglobin A1c 10.2 %     Severity is high       Macrovascular complications---none      Microvascular complications ---neuropathy, and renal disease      Current diabetes regimen      Oral medications, insulin , GLP-1     Current glucose monitoring            Checking 4 times daily and has been for over 90 days        Has the DEXCOM G6      Downloaded below             She does have history of low sugars     States sugars are variable from 50 up to 300         Review of Systems - General ROS: negative      Objective   Vital Signs:   /76   Pulse 81   Ht 157.5 cm (62\")   Wt 69.9 kg (154 lb)   SpO2 99%   BMI 28.17 kg/m²     Physical Exam  Constitutional:       Appearance: Normal appearance.   Cardiovascular:      Rate and Rhythm: Regular rhythm.      Heart sounds: Normal heart sounds.   Pulmonary:      Breath sounds: Normal breath sounds.   Musculoskeletal:      Cervical back: Normal range of motion.   Neurological:      Mental Status: She is alert.        Result Review :   The following data was reviewed by: BARB Trotter on 09/01/2021:  Common labs    Common Labsle 7/23/21 7/23/21    0045 0045   Glucose  250 (A)   BUN  26 (A)   Creatinine  1.6 (A)   eGFR Non  Am  32 (A)   eGFR African Am  38 (A)   Sodium  138   Potassium  3.8   Chloride  101   Calcium  10.0   Albumin  4.6   Total Bilirubin  0.3   Alkaline Phosphatase  58   AST (SGOT)  30   ALT (SGPT)  18   WBC 7.9    Hemoglobin 12.1    Hematocrit 37.2    Platelets 279    (A) Abnormal value     "   Comments are available for some flowsheets but are not being displayed.                     Assessment and Plan    Diagnoses and all orders for this visit:    1. Type 2 diabetes mellitus with hypoglycemia without coma, with long-term current use of insulin (CMS/[facility]) (Primary)  -     CBC & Differential; Future  -     Comprehensive Metabolic Panel; Future  -     Hemoglobin A1c; Future  -     Lipid Panel; Future  -     Microalbumin / Creatinine Urine Ratio - Urine, Clean Catch; Future  -     Protein / Creatinine Ratio, Urine - Urine, Clean Catch; Future  -     TSH; Future  -     Vitamin D 25 Hydroxy; Future  -     Vitamin B12; Future    2. Essential hypertension  -     CBC & Differential; Future  -     Comprehensive Metabolic Panel; Future  -     Hemoglobin A1c; Future  -     Lipid Panel; Future  -     Microalbumin / Creatinine Urine Ratio - Urine, Clean Catch; Future  -     Protein / Creatinine Ratio, Urine - Urine, Clean Catch; Future  -     TSH; Future  -     Vitamin D 25 Hydroxy; Future  -     Vitamin B12; Future    3. Acquired hypothyroidism  -     CBC & Differential; Future  -     Comprehensive Metabolic Panel; Future  -     Hemoglobin A1c; Future  -     Lipid Panel; Future  -     Microalbumin / Creatinine Urine Ratio - Urine, Clean Catch; Future  -     Protein / Creatinine Ratio, Urine - Urine, Clean Catch; Future  -     TSH; Future  -     Vitamin D 25 Hydroxy; Future  -     Vitamin B12; Future    4. Mixed hyperlipidemia  -     CBC & Differential; Future  -     Comprehensive Metabolic Panel; Future  -     Hemoglobin A1c; Future  -     Lipid Panel; Future  -     Microalbumin / Creatinine Urine Ratio - Urine, Clean Catch; Future  -     Protein / Creatinine Ratio, Urine - Urine, Clean Catch; Future  -     TSH; Future  -     Vitamin D 25 Hydroxy; Future  -     Vitamin B12; Future    5. Vitamin D deficiency  -     CBC & Differential; Future  -     Comprehensive Metabolic Panel; Future  -     Hemoglobin A1c;  Future  -     Lipid Panel; Future  -     Microalbumin / Creatinine Urine Ratio - Urine, Clean Catch; Future  -     Protein / Creatinine Ratio, Urine - Urine, Clean Catch; Future  -     TSH; Future  -     Vitamin D 25 Hydroxy; Future  -     Vitamin B12; Future           Glycemic Management:        Diabetes mellitus type 2      June 2021     HgbA1c 10.2%         Dexcom downloaded and reviewed    Dated from August 19 to September 1, 2021                    Overall she is at goal she has an occasional postprandial hyperglycemia after her lunchtime meal otherwise download looks great     She does have hypoglycemia overnight --decrease basal        adjust mealtime insulin           Injecting  Lantus  25 units once a day---decrease to 22 units once daily          Taking  Trulicity 0.75 mg once weekly     Side effects can include nausea, if nausea occurs while you are eating stop eating the medicine is telling you you are full     If you have vomiting and abdominal pain stop medication and call the office         Humalog injecting TID before each meal     2 up to 4  units with meals TID before each meal    Giving 2 for lunch increase to 4 units   +      Sliding scale Humalog     1 unit for every 50 above 150       Uses dexcom data to help adjust insulin dosage          glipizide 10 mg one daily --stopped due to hypoglycemia             Goals for sugar     Fasting and before meals 80 to 130     2 hours after meals 180 or less        Aim for 45 grams of carbohydrate per meal     Aim for 15 grams of carbohydrate per snack               Called in GVOKE      Dexcom has allowed the patient to minimize hypoglycemia as alarms have predicted and avoided them    She also uses the arrows on the dexcom as follows:    If arrow is horizontal , she uses the predicted dosage     If the arrow is slanted up or down-- she increase or decrease by 2 units    If the arrow is vertical up or down - she increases or decreases by 4  unit       Microvascular Complications Monitoring         Last eye exam--Dec. 2021 , no       Neuropathy -yes     Restart gabapentin 300 mg          Lipid Management:         Taking tricor 145 mg daily               Blood Pressure Management:        Taking labetalol 300 mg      Taking losartan 100 mg daily      Taking Norvasc 5 mg daily            Thyroid Health     Hypothyroidism      Taking levothyroxine 88 mcg daily         June 2021      TSH - 3      Bone Health         Taking vitamin d supplement            Weight Management:             Preventive Care:      Non smoker             Follow Up   Return in about 3 months (around 12/1/2021) for Recheck.  Patient was given instructions and counseling regarding her condition or for health maintenance advice. Please see specific information pulled into the AVS if appropriate.         This document has been electronically signed by BARB Trotter on September 1, 2021 16:29 CDT.

## 2021-09-02 ENCOUNTER — DOCUMENTATION (OUTPATIENT)
Dept: ENDOCRINOLOGY | Facility: CLINIC | Age: 71
End: 2021-09-02

## 2021-09-08 ENCOUNTER — TELEPHONE (OUTPATIENT)
Dept: ENDOCRINOLOGY | Facility: CLINIC | Age: 71
End: 2021-09-08

## 2021-09-08 RX ORDER — LANCETS 30 GAUGE
EACH MISCELLANEOUS
Qty: 300 EACH | Refills: 3 | Status: SHIPPED | OUTPATIENT
Start: 2021-09-08 | End: 2023-02-20

## 2021-09-08 NOTE — TELEPHONE ENCOUNTER
Pt needs acu check soft click lancets 100 ct   Acu check Viva plus test strips 100 ct (50X2)    With 3 mo supply and refills wants sent to       [facility] mail order

## 2021-09-09 ENCOUNTER — TELEPHONE (OUTPATIENT)
Dept: ENDOCRINOLOGY | Facility: CLINIC | Age: 71
End: 2021-09-09

## 2021-09-09 NOTE — TELEPHONE ENCOUNTER
Pt left VM that she was feeling extremely dizzy and needs something called in for her     [facility]

## 2021-09-13 ENCOUNTER — TELEPHONE (OUTPATIENT)
Dept: ENDOCRINOLOGY | Facility: CLINIC | Age: 71
End: 2021-09-13

## 2021-09-14 ENCOUNTER — TELEPHONE (OUTPATIENT)
Dept: ENDOCRINOLOGY | Facility: CLINIC | Age: 71
End: 2021-09-14

## 2021-09-21 ENCOUNTER — TELEPHONE (OUTPATIENT)
Dept: ENDOCRINOLOGY | Facility: CLINIC | Age: 71
End: 2021-09-21

## 2021-09-21 NOTE — TELEPHONE ENCOUNTER
LM FOR PT. LANTUS DOES NOT CAUSE HIGH SUGARS. HER WAKING SUGARS ARE NORMAL MEANING LANTUS IS WORKING. SHE IS SPIKING AFTER MEALS = NEEDS TO GIVE THE HUMALOG. COFFEE HAS CAFFEINE. CAFFEINE IS A HORMONE. HORMONES RAISE BLOOD SUGAR. GIVE INSULIN FOR COFFEE. CALL BACK FOR OTHER QUESTIONS.

## 2021-09-21 NOTE — TELEPHONE ENCOUNTER
PT CALLED TO DISCUSS LANTIS MAKING HER SUGAR RISE. ASKS IF THIS IS NORMAL.    HAS DEXCOM UPLOAD FOR YOU TO SEE ON COMPUTER.    174 AT 1P INCREASED     COFFEE, COTTAGE CHEESE AND TOMATO FOR LUNCH EVERY DAY      PLEASE CALL ASAP [phone].

## 2021-09-28 ENCOUNTER — TELEPHONE (OUTPATIENT)
Dept: ENDOCRINOLOGY | Facility: CLINIC | Age: 71
End: 2021-09-28

## 2021-09-28 NOTE — TELEPHONE ENCOUNTER
PT CALLED DUE TO HYPOGLYCEMIA OVERNIGHT AND WAKING LOWS. VERIFIED WITH DEXCOM. CURRENTLY SHE IS TAKING 22 UNITS LANTUS AND SS OF NOVOLOG IF SUGAR ABOVE 150. DECREASE LANTUS TO 18 UNITS.   IF CONTINUED LOW SUGARS, INSTRUCTED TO CALL BACK BY Friday. PT VERBALIZED UNDERSTANDING.

## 2021-10-18 ENCOUNTER — DOCUMENTATION (OUTPATIENT)
Dept: ENDOCRINOLOGY | Facility: CLINIC | Age: 71
End: 2021-10-18

## 2021-10-18 ENCOUNTER — TELEPHONE (OUTPATIENT)
Dept: ENDOCRINOLOGY | Facility: CLINIC | Age: 71
End: 2021-10-18

## 2021-10-18 NOTE — TELEPHONE ENCOUNTER
Pt states every day after lunch her sugar goes up no matter what she eats. Today it is 246. She needs help on what she can do to correct this. Please call pt.

## 2021-10-18 NOTE — PROGRESS NOTES
SPOKE WITH PT, READ CHART NOTES TO HER REGARDING HER SLIDING SCALE AND BEFORE MEAL INSULIN DOSE.     PT EXPLAINED SHE HAD NOT BEEN GIVING HERSELF ANY INSULIN BEFORE SHE WAS EATING AND THEN HAVING HIGHS AFTER SHE ATE HER LUNCH REGARDLESS OF WHAT SHE ATE.     PT EXPRESSED VERBAL UNDERSTANDING THAT SHE NEEDS TO BE GIVING INSULIN BEFORE MEAL TO CONTROL SUGARS AFTER SHE ATE THEN FOLLOW SLIDING SCALE IF IT IS NEEDED.

## 2021-11-12 ENCOUNTER — LAB (OUTPATIENT)
Dept: LAB | Facility: HOSPITAL | Age: 71
End: 2021-11-12

## 2021-11-12 DIAGNOSIS — E11.649 TYPE 2 DIABETES MELLITUS WITH HYPOGLYCEMIA WITHOUT COMA, WITH LONG-TERM CURRENT USE OF INSULIN (HCC): ICD-10-CM

## 2021-11-12 DIAGNOSIS — Z79.4 TYPE 2 DIABETES MELLITUS WITH HYPOGLYCEMIA WITHOUT COMA, WITH LONG-TERM CURRENT USE OF INSULIN (HCC): ICD-10-CM

## 2021-11-12 DIAGNOSIS — E78.2 MIXED HYPERLIPIDEMIA: ICD-10-CM

## 2021-11-12 DIAGNOSIS — E03.9 ACQUIRED HYPOTHYROIDISM: ICD-10-CM

## 2021-11-12 DIAGNOSIS — I10 ESSENTIAL HYPERTENSION: ICD-10-CM

## 2021-11-12 DIAGNOSIS — E55.9 VITAMIN D DEFICIENCY: ICD-10-CM

## 2021-11-12 LAB
ALBUMIN SERPL-MCNC: 4.4 G/DL (ref 3.5–5.2)
ALBUMIN/GLOB SERPL: 1.8 G/DL
ALP SERPL-CCNC: 48 U/L (ref 39–117)
ALT SERPL W P-5'-P-CCNC: 34 U/L (ref 1–33)
ANION GAP SERPL CALCULATED.3IONS-SCNC: 12 MMOL/L (ref 5–15)
AST SERPL-CCNC: 42 U/L (ref 1–32)
BASOPHILS # BLD AUTO: 0.06 10*3/MM3 (ref 0–0.2)
BASOPHILS NFR BLD AUTO: 0.9 % (ref 0–1.5)
BILIRUB SERPL-MCNC: 0.4 MG/DL (ref 0–1.2)
BUN SERPL-MCNC: 43 MG/DL (ref 8–23)
BUN/CREAT SERPL: 30.5 (ref 7–25)
CALCIUM SPEC-SCNC: 10 MG/DL (ref 8.6–10.5)
CHLORIDE SERPL-SCNC: 103 MMOL/L (ref 98–107)
CO2 SERPL-SCNC: 26 MMOL/L (ref 22–29)
CREAT SERPL-MCNC: 1.41 MG/DL (ref 0.57–1)
DEPRECATED RDW RBC AUTO: 44.5 FL (ref 37–54)
EOSINOPHIL # BLD AUTO: 0.29 10*3/MM3 (ref 0–0.4)
EOSINOPHIL NFR BLD AUTO: 4.5 % (ref 0.3–6.2)
ERYTHROCYTE [DISTWIDTH] IN BLOOD BY AUTOMATED COUNT: 13.9 % (ref 12.3–15.4)
GFR SERPL CREATININE-BSD FRML MDRD: 37 ML/MIN/1.73
GLOBULIN UR ELPH-MCNC: 2.5 GM/DL
GLUCOSE SERPL-MCNC: 231 MG/DL (ref 65–99)
HCT VFR BLD AUTO: 35.9 % (ref 34–46.6)
HGB BLD-MCNC: 11.3 G/DL (ref 12–15.9)
IMM GRANULOCYTES # BLD AUTO: 0.02 10*3/MM3 (ref 0–0.05)
IMM GRANULOCYTES NFR BLD AUTO: 0.3 % (ref 0–0.5)
LYMPHOCYTES # BLD AUTO: 1.6 10*3/MM3 (ref 0.7–3.1)
LYMPHOCYTES NFR BLD AUTO: 24.9 % (ref 19.6–45.3)
MCH RBC QN AUTO: 27.4 PG (ref 26.6–33)
MCHC RBC AUTO-ENTMCNC: 31.5 G/DL (ref 31.5–35.7)
MCV RBC AUTO: 87.1 FL (ref 79–97)
MONOCYTES # BLD AUTO: 0.41 10*3/MM3 (ref 0.1–0.9)
MONOCYTES NFR BLD AUTO: 6.4 % (ref 5–12)
NEUTROPHILS NFR BLD AUTO: 4.04 10*3/MM3 (ref 1.7–7)
NEUTROPHILS NFR BLD AUTO: 63 % (ref 42.7–76)
NRBC BLD AUTO-RTO: 0 /100 WBC (ref 0–0.2)
PLATELET # BLD AUTO: 257 10*3/MM3 (ref 140–450)
PMV BLD AUTO: 11.7 FL (ref 6–12)
POTASSIUM SERPL-SCNC: 4.6 MMOL/L (ref 3.5–5.2)
PROT SERPL-MCNC: 6.9 G/DL (ref 6–8.5)
RBC # BLD AUTO: 4.12 10*6/MM3 (ref 3.77–5.28)
SODIUM SERPL-SCNC: 141 MMOL/L (ref 136–145)
WBC # BLD AUTO: 6.42 10*3/MM3 (ref 3.4–10.8)

## 2021-11-12 PROCEDURE — 36415 COLL VENOUS BLD VENIPUNCTURE: CPT

## 2021-11-12 PROCEDURE — 82607 VITAMIN B-12: CPT

## 2021-11-12 PROCEDURE — 82306 VITAMIN D 25 HYDROXY: CPT

## 2021-11-12 PROCEDURE — 84443 ASSAY THYROID STIM HORMONE: CPT

## 2021-11-12 PROCEDURE — 84156 ASSAY OF PROTEIN URINE: CPT

## 2021-11-12 PROCEDURE — 83036 HEMOGLOBIN GLYCOSYLATED A1C: CPT

## 2021-11-12 PROCEDURE — 80061 LIPID PANEL: CPT

## 2021-11-12 PROCEDURE — 82043 UR ALBUMIN QUANTITATIVE: CPT

## 2021-11-12 PROCEDURE — 80053 COMPREHEN METABOLIC PANEL: CPT

## 2021-11-12 PROCEDURE — 82570 ASSAY OF URINE CREATININE: CPT

## 2021-11-12 PROCEDURE — 85025 COMPLETE CBC W/AUTO DIFF WBC: CPT

## 2021-11-13 LAB
25(OH)D3 SERPL-MCNC: 74.1 NG/ML (ref 30–100)
ALBUMIN UR-MCNC: 1.9 MG/DL
CHOLEST SERPL-MCNC: 156 MG/DL (ref 0–200)
CREAT UR-MCNC: 69.5 MG/DL
CREAT UR-MCNC: 69.5 MG/DL
HBA1C MFR BLD: 6.87 % (ref 4.8–5.6)
HDLC SERPL-MCNC: 67 MG/DL (ref 40–60)
LDLC SERPL CALC-MCNC: 72 MG/DL (ref 0–100)
LDLC/HDLC SERPL: 1.04 {RATIO}
MICROALBUMIN/CREAT UR: 27.3 MG/G
PROT UR-MCNC: 12 MG/DL
PROT/CREAT UR: 172.7 MG/G CREA (ref 0–200)
TRIGL SERPL-MCNC: 95 MG/DL (ref 0–150)
TSH SERPL DL<=0.05 MIU/L-ACNC: 2.66 UIU/ML (ref 0.27–4.2)
VIT B12 BLD-MCNC: 1421 PG/ML (ref 211–946)
VLDLC SERPL-MCNC: 17 MG/DL (ref 5–40)

## 2021-12-01 ENCOUNTER — TRANSCRIBE ORDERS (OUTPATIENT)
Dept: ADMINISTRATIVE | Facility: HOSPITAL | Age: 71
End: 2021-12-01

## 2021-12-01 DIAGNOSIS — Z12.31 ENCOUNTER FOR SCREENING MAMMOGRAM FOR MALIGNANT NEOPLASM OF BREAST: Primary | ICD-10-CM

## 2021-12-08 ENCOUNTER — OFFICE VISIT (OUTPATIENT)
Dept: ENDOCRINOLOGY | Facility: CLINIC | Age: 71
End: 2021-12-08

## 2021-12-08 VITALS
BODY MASS INDEX: 25.34 KG/M2 | OXYGEN SATURATION: 98 % | SYSTOLIC BLOOD PRESSURE: 136 MMHG | DIASTOLIC BLOOD PRESSURE: 74 MMHG | WEIGHT: 143 LBS | HEART RATE: 92 BPM | HEIGHT: 63 IN

## 2021-12-08 DIAGNOSIS — Z79.4 TYPE 2 DIABETES MELLITUS WITH HYPOGLYCEMIA WITHOUT COMA, WITH LONG-TERM CURRENT USE OF INSULIN (HCC): ICD-10-CM

## 2021-12-08 DIAGNOSIS — E03.9 ACQUIRED HYPOTHYROIDISM: ICD-10-CM

## 2021-12-08 DIAGNOSIS — I10 ESSENTIAL HYPERTENSION: Primary | ICD-10-CM

## 2021-12-08 DIAGNOSIS — E78.2 MIXED HYPERLIPIDEMIA: ICD-10-CM

## 2021-12-08 DIAGNOSIS — E11.649 TYPE 2 DIABETES MELLITUS WITH HYPOGLYCEMIA WITHOUT COMA, WITH LONG-TERM CURRENT USE OF INSULIN (HCC): ICD-10-CM

## 2021-12-08 DIAGNOSIS — E55.9 VITAMIN D DEFICIENCY, UNSPECIFIED: ICD-10-CM

## 2021-12-08 PROCEDURE — 99214 OFFICE O/P EST MOD 30 MIN: CPT | Performed by: NURSE PRACTITIONER

## 2021-12-08 PROCEDURE — 95251 CONT GLUC MNTR ANALYSIS I&R: CPT | Performed by: NURSE PRACTITIONER

## 2021-12-08 RX ORDER — OXYBUTYNIN CHLORIDE 5 MG/1
TABLET ORAL
COMMUNITY
Start: 2021-12-03

## 2021-12-08 NOTE — PROGRESS NOTES
"Chief Complaint  Diabetes    Subjective          Kathryn Wray presents to [facility] ENDOCRINOLOGY  History of Present Illness       In office visit     Primary care provider Dr. Tom     71-year-old female presents for follow-up     Reason diabetes mellitus type 2     Timing is constant     Quality improved control     Duration diagnosed at age 38     Context diagnosed through routine lab work     June 2021 hemoglobin A1c 10.2 %      Severity is high        Macrovascular complications---none      Microvascular complications ---neuropathy, and renal disease      Current diabetes regimen      Oral medications, insulin , GLP-1     Current glucose monitoring            Checking 4 times daily and has been for over 90 days         Has the DEXCOM G6      Downloaded below                   Review of Systems - General ROS: negative          Objective   Vital Signs:   /74   Pulse 92   Ht 160 cm (63\")   Wt 64.9 kg (143 lb)   SpO2 98%   BMI 25.33 kg/m²     Physical Exam  Constitutional:       Appearance: Normal appearance.   Cardiovascular:      Rate and Rhythm: Regular rhythm.      Heart sounds: Normal heart sounds.   Pulmonary:      Breath sounds: Normal breath sounds.   Musculoskeletal:      Cervical back: Normal range of motion.   Neurological:      Mental Status: She is alert.        Result Review :   The following data was reviewed by: BARB Trotter on 12/08/2021:  Common labs    Common Labsle 7/23/21 7/23/21 11/12/21 11/12/21 11/12/21 11/12/21 11/12/21    0045 0045 1452 1452 1452 1452 1452   Glucose  250 (A)  231 (A)      BUN  26 (A)  43 (A)      Creatinine  1.6 (A)  1.41 (A)      eGFR Non  Am  32 (A)  37 (A)      eGFR  Am  38 (A)        Sodium  138  141      Potassium  3.8  4.6      Chloride  101  103      Calcium  10.0  10.0      Albumin  4.6  4.40      Total Bilirubin  0.3  0.4      Alkaline Phosphatase  58  48      AST (SGOT)  30  42 (A)    "   ALT (SGPT)  18  34 (A)      WBC 7.9  6.42       Hemoglobin 12.1  11.3 (A)       Hematocrit 37.2  35.9       Platelets 279  257       Total Cholesterol      156    Triglycerides      95    HDL Cholesterol      67 (A)    LDL Cholesterol       72    Hemoglobin A1C     6.87 (A)     Microalbumin, Urine       1.9   (A) Abnormal value       Comments are available for some flowsheets but are not being displayed.                     Assessment and Plan    Diagnoses and all orders for this visit:    1. Essential hypertension (Primary)  -     CBC & Differential; Future  -     Comprehensive Metabolic Panel; Future  -     Hemoglobin A1c; Future  -     Lipid Panel; Future  -     TSH; Future  -     Vitamin B12; Future  -     Vitamin D 25 Hydroxy; Future    2. Type 2 diabetes mellitus with hypoglycemia without coma, with long-term current use of insulin (HCC)  -     CBC & Differential; Future  -     Comprehensive Metabolic Panel; Future  -     Hemoglobin A1c; Future  -     Lipid Panel; Future  -     TSH; Future  -     Vitamin B12; Future  -     Vitamin D 25 Hydroxy; Future    3. Acquired hypothyroidism  -     CBC & Differential; Future  -     Comprehensive Metabolic Panel; Future  -     Hemoglobin A1c; Future  -     Lipid Panel; Future  -     TSH; Future  -     Vitamin B12; Future  -     Vitamin D 25 Hydroxy; Future    4. Mixed hyperlipidemia  -     CBC & Differential; Future  -     Comprehensive Metabolic Panel; Future  -     Hemoglobin A1c; Future  -     Lipid Panel; Future  -     TSH; Future  -     Vitamin B12; Future  -     Vitamin D 25 Hydroxy; Future    5. Vitamin D deficiency, unspecified   -     Vitamin D 25 Hydroxy; Future           Glycemic Management:        Diabetes mellitus type 2            Lab Results   Component Value Date    HGBA1C 6.87 (H) 11/12/2021             Dexcom downloaded and reviewed                 hypoglycemia over night --- decrease basal                  Taking  Lantus  18 units --decrease to 14  units            Taking  Trulicity 0.75 mg once weekly--increase to 1.5 mg weekly      Side effects can include nausea, if nausea occurs while you are eating stop eating the medicine is telling you you are full     If you have vomiting and abdominal pain stop medication and call the office           Humalog injecting TID before each meal      2 up to 4  units with meals TID before each meal     Giving 2 for lunch increase to 4 units   +      Sliding scale Humalog     1 unit for every 50 above 150        Uses dexcom data to help adjust insulin dosage             glipizide 10 mg one daily --stopped due to hypoglycemia               Goals for sugar     Fasting and before meals 80 to 130     2 hours after meals 180 or less        Aim for 45 grams of carbohydrate per meal     Aim for 15 grams of carbohydrate per snack               Called in GVOKE      Dexcom has allowed the patient to minimize hypoglycemia as alarms have predicted and avoided them     She also uses the arrows on the dexcom as follows:     If arrow is horizontal , she uses the predicted dosage      If the arrow is slanted up or down-- she increase or decrease by 2 units     If the arrow is vertical up or down - she increases or decreases by 4  unit      Microvascular Complications Monitoring         Last eye exam--Dec. 2021 , no DR      Neuropathy -yes        Taking  gabapentin 300 mg          Lipid Management:         Taking tricor 145 mg daily               Blood Pressure Management:        Taking labetalol 300 mg      Taking losartan 100 mg daily      Taking Norvasc 5 mg daily            Thyroid Health     Hypothyroidism      Taking levothyroxine 88 mcg daily         June 2021      TSH - 3      Bone Health         Taking vitamin d supplement            Weight Management:      Patient's Body mass index is 25.33 kg/m². indicating that she is overweight (BMI 25-29.9). Obesity-related health conditions include the following: diabetes mellitus. Obesity is  improving with treatment. BMI is is above average; no BMI management plan is appropriate. We discussed portion control..        Preventive Care:      Non smoker                  Follow Up   Return in about 3 months (around 3/8/2022) for Recheck.  Patient was given instructions and counseling regarding her condition or for health maintenance advice. Please see specific information pulled into the AVS if appropriate.         This document has been electronically signed by BARB Trotter on December 8, 2021 16:22 CST.

## 2021-12-09 ENCOUNTER — TELEPHONE (OUTPATIENT)
Dept: ENDOCRINOLOGY | Facility: CLINIC | Age: 71
End: 2021-12-09

## 2021-12-09 NOTE — TELEPHONE ENCOUNTER
Patient called stating she was told by Rafael that Tylenol is hard on her liver and the patient wants to know what she can take?    She also want to know if there is some kind of assistance she can get to help pay for her insulin?    She said it is ok to leave a message.         Please Advise    Thanks

## 2021-12-10 NOTE — TELEPHONE ENCOUNTER
She needs to look into patient assistance through [facility]     She can take tylenol but sparingly

## 2022-01-07 ENCOUNTER — APPOINTMENT (OUTPATIENT)
Dept: MAMMOGRAPHY | Facility: HOSPITAL | Age: 72
End: 2022-01-07

## 2022-01-10 ENCOUNTER — TELEPHONE (OUTPATIENT)
Dept: ENDOCRINOLOGY | Facility: CLINIC | Age: 72
End: 2022-01-10

## 2022-01-10 ENCOUNTER — DOCUMENTATION (OUTPATIENT)
Dept: ENDOCRINOLOGY | Facility: CLINIC | Age: 72
End: 2022-01-10

## 2022-01-10 NOTE — PROGRESS NOTES
PT WAS HAVING HIGH SUGARS OVER NIGHT SO I ADVISED PT TO TAKE HER LONG ACTING INSULIN RIGHT BEFORE BED TO HELP WITH THOSE OVER NIGHT HIGHS.

## 2022-01-21 ENCOUNTER — HOSPITAL ENCOUNTER (OUTPATIENT)
Dept: MAMMOGRAPHY | Facility: HOSPITAL | Age: 72
Discharge: HOME | End: 2022-01-21
Admitting: INTERNAL MEDICINE

## 2022-01-21 DIAGNOSIS — Z12.31 ENCOUNTER FOR SCREENING MAMMOGRAM FOR MALIGNANT NEOPLASM OF BREAST: ICD-10-CM

## 2022-01-21 PROCEDURE — 77067 SCR MAMMO BI INCL CAD: CPT

## 2022-01-21 PROCEDURE — 77063 BREAST TOMOSYNTHESIS BI: CPT

## 2022-01-31 ENCOUNTER — TELEPHONE (OUTPATIENT)
Dept: ENDOCRINOLOGY | Facility: CLINIC | Age: 72
End: 2022-01-31

## 2022-02-01 ENCOUNTER — TELEPHONE (OUTPATIENT)
Dept: ENDOCRINOLOGY | Facility: CLINIC | Age: 72
End: 2022-02-01

## 2022-02-01 NOTE — TELEPHONE ENCOUNTER
Pt states that her sugar stays high regardless of what she eats or how much insulin she gives and is needing a call back when you get the chance.     [phone]

## 2022-02-08 ENCOUNTER — HOSPITAL ENCOUNTER (OUTPATIENT)
Dept: GENERAL RADIOLOGY | Facility: HOSPITAL | Age: 72
Discharge: HOME | End: 2022-02-08
Admitting: INTERNAL MEDICINE

## 2022-02-08 ENCOUNTER — TRANSCRIBE ORDERS (OUTPATIENT)
Dept: GENERAL RADIOLOGY | Facility: HOSPITAL | Age: 72
End: 2022-02-08

## 2022-02-08 DIAGNOSIS — Z78.0 POST-MENOPAUSAL: ICD-10-CM

## 2022-02-08 DIAGNOSIS — Z78.0 POST-MENOPAUSAL: Primary | ICD-10-CM

## 2022-02-08 PROCEDURE — 77080 DXA BONE DENSITY AXIAL: CPT

## 2022-03-11 ENCOUNTER — LAB (OUTPATIENT)
Dept: LAB | Facility: HOSPITAL | Age: 72
End: 2022-03-11

## 2022-03-11 DIAGNOSIS — E78.2 MIXED HYPERLIPIDEMIA: ICD-10-CM

## 2022-03-11 DIAGNOSIS — E11.649 TYPE 2 DIABETES MELLITUS WITH HYPOGLYCEMIA WITHOUT COMA, WITH LONG-TERM CURRENT USE OF INSULIN: ICD-10-CM

## 2022-03-11 DIAGNOSIS — E55.9 VITAMIN D DEFICIENCY, UNSPECIFIED: ICD-10-CM

## 2022-03-11 DIAGNOSIS — E03.9 ACQUIRED HYPOTHYROIDISM: ICD-10-CM

## 2022-03-11 DIAGNOSIS — Z79.4 TYPE 2 DIABETES MELLITUS WITH HYPOGLYCEMIA WITHOUT COMA, WITH LONG-TERM CURRENT USE OF INSULIN: ICD-10-CM

## 2022-03-11 DIAGNOSIS — I10 ESSENTIAL HYPERTENSION: ICD-10-CM

## 2022-03-11 LAB
ALBUMIN SERPL-MCNC: 4.3 G/DL (ref 3.5–5.2)
ALBUMIN/GLOB SERPL: 1.3 G/DL
ALP SERPL-CCNC: 36 U/L (ref 39–117)
ALT SERPL W P-5'-P-CCNC: 22 U/L (ref 1–33)
ANION GAP SERPL CALCULATED.3IONS-SCNC: 11 MMOL/L (ref 5–15)
AST SERPL-CCNC: 34 U/L (ref 1–32)
BASOPHILS # BLD AUTO: 0.05 10*3/MM3 (ref 0–0.2)
BASOPHILS NFR BLD AUTO: 0.7 % (ref 0–1.5)
BILIRUB SERPL-MCNC: 0.5 MG/DL (ref 0–1.2)
BUN SERPL-MCNC: 40 MG/DL (ref 8–23)
BUN/CREAT SERPL: 27.2 (ref 7–25)
CALCIUM SPEC-SCNC: 9.7 MG/DL (ref 8.6–10.5)
CHLORIDE SERPL-SCNC: 103 MMOL/L (ref 98–107)
CHOLEST SERPL-MCNC: 152 MG/DL (ref 0–200)
CO2 SERPL-SCNC: 27 MMOL/L (ref 22–29)
CREAT SERPL-MCNC: 1.47 MG/DL (ref 0.57–1)
DEPRECATED RDW RBC AUTO: 47.8 FL (ref 37–54)
EGFRCR SERPLBLD CKD-EPI 2021: 37.5 ML/MIN/1.73
EOSINOPHIL # BLD AUTO: 0.38 10*3/MM3 (ref 0–0.4)
EOSINOPHIL NFR BLD AUTO: 5 % (ref 0.3–6.2)
ERYTHROCYTE [DISTWIDTH] IN BLOOD BY AUTOMATED COUNT: 14.4 % (ref 12.3–15.4)
GLOBULIN UR ELPH-MCNC: 3.2 GM/DL
GLUCOSE SERPL-MCNC: 167 MG/DL (ref 65–99)
HBA1C MFR BLD: 7.3 % (ref 4.8–5.6)
HCT VFR BLD AUTO: 36.5 % (ref 34–46.6)
HDLC SERPL-MCNC: 73 MG/DL (ref 40–60)
HGB BLD-MCNC: 11.6 G/DL (ref 12–15.9)
IMM GRANULOCYTES # BLD AUTO: 0.03 10*3/MM3 (ref 0–0.05)
IMM GRANULOCYTES NFR BLD AUTO: 0.4 % (ref 0–0.5)
LDLC SERPL CALC-MCNC: 63 MG/DL (ref 0–100)
LDLC/HDLC SERPL: 0.85 {RATIO}
LYMPHOCYTES # BLD AUTO: 2.08 10*3/MM3 (ref 0.7–3.1)
LYMPHOCYTES NFR BLD AUTO: 27.6 % (ref 19.6–45.3)
MCH RBC QN AUTO: 28.6 PG (ref 26.6–33)
MCHC RBC AUTO-ENTMCNC: 31.8 G/DL (ref 31.5–35.7)
MCV RBC AUTO: 90.1 FL (ref 79–97)
MONOCYTES # BLD AUTO: 0.47 10*3/MM3 (ref 0.1–0.9)
MONOCYTES NFR BLD AUTO: 6.2 % (ref 5–12)
NEUTROPHILS NFR BLD AUTO: 4.53 10*3/MM3 (ref 1.7–7)
NEUTROPHILS NFR BLD AUTO: 60.1 % (ref 42.7–76)
NRBC BLD AUTO-RTO: 0 /100 WBC (ref 0–0.2)
PLATELET # BLD AUTO: 277 10*3/MM3 (ref 140–450)
PMV BLD AUTO: 10.8 FL (ref 6–12)
POTASSIUM SERPL-SCNC: 4.1 MMOL/L (ref 3.5–5.2)
PROT SERPL-MCNC: 7.5 G/DL (ref 6–8.5)
RBC # BLD AUTO: 4.05 10*6/MM3 (ref 3.77–5.28)
SODIUM SERPL-SCNC: 141 MMOL/L (ref 136–145)
TRIGL SERPL-MCNC: 83 MG/DL (ref 0–150)
TSH SERPL DL<=0.05 MIU/L-ACNC: 2.29 UIU/ML (ref 0.27–4.2)
VLDLC SERPL-MCNC: 16 MG/DL (ref 5–40)
WBC NRBC COR # BLD: 7.54 10*3/MM3 (ref 3.4–10.8)

## 2022-03-11 PROCEDURE — 80053 COMPREHEN METABOLIC PANEL: CPT

## 2022-03-11 PROCEDURE — 83036 HEMOGLOBIN GLYCOSYLATED A1C: CPT

## 2022-03-11 PROCEDURE — 85025 COMPLETE CBC W/AUTO DIFF WBC: CPT

## 2022-03-11 PROCEDURE — 82607 VITAMIN B-12: CPT

## 2022-03-11 PROCEDURE — 80061 LIPID PANEL: CPT

## 2022-03-11 PROCEDURE — 84443 ASSAY THYROID STIM HORMONE: CPT

## 2022-03-11 PROCEDURE — 82306 VITAMIN D 25 HYDROXY: CPT

## 2022-03-11 PROCEDURE — 36415 COLL VENOUS BLD VENIPUNCTURE: CPT

## 2022-03-12 LAB
25(OH)D3 SERPL-MCNC: 81.4 NG/ML (ref 30–100)
VIT B12 BLD-MCNC: 561 PG/ML (ref 211–946)

## 2022-03-18 ENCOUNTER — OFFICE VISIT (OUTPATIENT)
Dept: ENDOCRINOLOGY | Facility: CLINIC | Age: 72
End: 2022-03-18

## 2022-03-18 VITALS
BODY MASS INDEX: 26.45 KG/M2 | DIASTOLIC BLOOD PRESSURE: 74 MMHG | OXYGEN SATURATION: 99 % | SYSTOLIC BLOOD PRESSURE: 136 MMHG | HEIGHT: 61 IN | HEART RATE: 91 BPM | WEIGHT: 140.1 LBS

## 2022-03-18 DIAGNOSIS — I10 ESSENTIAL HYPERTENSION: ICD-10-CM

## 2022-03-18 DIAGNOSIS — Z79.4 TYPE 2 DIABETES MELLITUS WITH HYPOGLYCEMIA WITHOUT COMA, WITH LONG-TERM CURRENT USE OF INSULIN: Primary | ICD-10-CM

## 2022-03-18 DIAGNOSIS — E11.649 TYPE 2 DIABETES MELLITUS WITH HYPOGLYCEMIA WITHOUT COMA, WITH LONG-TERM CURRENT USE OF INSULIN: Primary | ICD-10-CM

## 2022-03-18 DIAGNOSIS — E78.2 MIXED HYPERLIPIDEMIA: ICD-10-CM

## 2022-03-18 DIAGNOSIS — E55.9 VITAMIN D DEFICIENCY, UNSPECIFIED: ICD-10-CM

## 2022-03-18 DIAGNOSIS — E03.9 ACQUIRED HYPOTHYROIDISM: ICD-10-CM

## 2022-03-18 PROCEDURE — 95251 CONT GLUC MNTR ANALYSIS I&R: CPT | Performed by: NURSE PRACTITIONER

## 2022-03-18 PROCEDURE — 99214 OFFICE O/P EST MOD 30 MIN: CPT | Performed by: NURSE PRACTITIONER

## 2022-03-18 RX ORDER — SEMAGLUTIDE 1.34 MG/ML
0.5 INJECTION, SOLUTION SUBCUTANEOUS WEEKLY
Qty: 1.5 ML | Refills: 11 | Status: SHIPPED | OUTPATIENT
Start: 2022-03-18 | End: 2022-10-07

## 2022-03-18 NOTE — PROGRESS NOTES
"Chief Complaint  Diabetes    Subjective          Kathryn Wray presents to [facility] ENDOCRINOLOGY  History of Present Illness      In office visit       Primary care provider Dr. Tom     71-year-old female presents for follow-up     Reason diabetes mellitus type 2     Timing is constant     Quality improved control     Duration diagnosed at age 38     Context diagnosed through routine lab work     June 2021 hemoglobin A1c 10.2 %      Severity is high        Macrovascular complications---none      Microvascular complications ---neuropathy, and renal disease      Current diabetes regimen      Oral medications, insulin , GLP-1     Current glucose monitoring            Checking 4 times daily and has been for over 90 days         Has the DEXCOM G6      Downloaded below                     Objective   Vital Signs:   /74   Pulse 91   Ht 154.9 cm (61\")   Wt 63.5 kg (140 lb 1.6 oz)   SpO2 99%   BMI 26.47 kg/m²     Physical Exam  Constitutional:       Appearance: Normal appearance.   Cardiovascular:      Rate and Rhythm: Regular rhythm.      Heart sounds: Normal heart sounds.   Pulmonary:      Breath sounds: Normal breath sounds.   Musculoskeletal:         General: Normal range of motion.      Cervical back: Normal range of motion.   Neurological:      Mental Status: She is alert.        Result Review :   The following data was reviewed by: BARB Trotter on 03/18/2022:  Common labs    Common Labsle 7/23/21 7/23/21 11/12/21 11/12/21 11/12/21 11/12/21 11/12/21 3/11/22 3/11/22 3/11/22 3/11/22    0045 0045 1452 1452 1452 1452 1452 1502 1502 1502 1502   Glucose  250 (A)  231 (A)      167 (A)    BUN  26 (A)  43 (A)      40 (A)    Creatinine  1.6 (A)  1.41 (A)      1.47 (A)    eGFR Non  Am  32 (A)  37 (A)          eGFR  Am  38 (A)            Sodium  138  141      141    Potassium  3.8  4.6      4.1    Chloride  101  103      103    Calcium  10.0  10.0      " 9.7    Albumin  4.6  4.40      4.30    Total Bilirubin  0.3  0.4      0.5    Alkaline Phosphatase  58  48      36 (A)    AST (SGOT)  30  42 (A)      34 (A)    ALT (SGPT)  18  34 (A)      22    WBC 7.9  6.42     7.54      Hemoglobin 12.1  11.3 (A)     11.6 (A)      Hematocrit 37.2  35.9     36.5      Platelets 279  257     277      Total Cholesterol      156     152   Triglycerides      95     83   HDL Cholesterol      67 (A)     73 (A)   LDL Cholesterol       72     63   Hemoglobin A1C     6.87 (A)    7.30 (A)     Microalbumin, Urine       1.9       (A) Abnormal value       Comments are available for some flowsheets but are not being displayed.                     Assessment and Plan    Diagnoses and all orders for this visit:    1. Type 2 diabetes mellitus with hypoglycemia without coma, with long-term current use of insulin (HCC) (Primary)  -     CBC & Differential; Future  -     Comprehensive Metabolic Panel; Future  -     Hemoglobin A1c; Future  -     Lipid Panel; Future  -     Microalbumin / Creatinine Urine Ratio - Urine, Clean Catch; Future  -     TSH; Future  -     Vitamin D 25 Hydroxy; Future    2. Essential hypertension  -     CBC & Differential; Future  -     Comprehensive Metabolic Panel; Future  -     Hemoglobin A1c; Future  -     Lipid Panel; Future  -     Microalbumin / Creatinine Urine Ratio - Urine, Clean Catch; Future  -     TSH; Future  -     Vitamin D 25 Hydroxy; Future    3. Acquired hypothyroidism  -     CBC & Differential; Future  -     Comprehensive Metabolic Panel; Future  -     Hemoglobin A1c; Future  -     Lipid Panel; Future  -     Microalbumin / Creatinine Urine Ratio - Urine, Clean Catch; Future  -     TSH; Future  -     Vitamin D 25 Hydroxy; Future    4. Mixed hyperlipidemia  -     CBC & Differential; Future  -     Comprehensive Metabolic Panel; Future  -     Hemoglobin A1c; Future  -     Lipid Panel; Future  -     Microalbumin / Creatinine Urine Ratio - Urine, Clean Catch; Future  -      TSH; Future  -     Vitamin D 25 Hydroxy; Future    5. Vitamin D deficiency, unspecified   -     Vitamin D 25 Hydroxy; Future    Other orders  -     empagliflozin (Jardiance) 10 MG tablet tablet; Take 1 tablet by mouth Daily. Before bkfast  Dispense: 30 tablet; Refill: 11  -     Semaglutide,0.25 or 0.5MG/DOS, (Ozempic, 0.25 or 0.5 MG/DOSE,) 2 MG/1.5ML solution pen-injector; Inject 0.5 mg under the skin into the appropriate area as directed 1 (One) Time Per Week. 0.5 mg weekly  Dispense: 1.5 mL; Refill: 11           Glycemic Management:        Diabetes mellitus type 2         Lab Results   Component Value Date    HGBA1C 7.30 (H) 03/11/2022             Dexcom downloaded and reviewed    Dated from March 5 to March 18, 2022     Average bg 137     Time in target 82%     High 16%     Very high 2%     Low 1.2%     Very low 0 %         having lows over night     Start jardiance 10 mg once daily               Taking  Lantus 14 units -- decrease to 10 units            Taking   1.5 mg weekly --- change to ozempic 0.5 mg weekly      Side effects can include nausea, if nausea occurs while you are eating stop eating the medicine is telling you you are full     If you have vomiting and abdominal pain stop medication and call the office           Humalog injecting TID before each meal      2 up to 4  units with meals TID before each meal     Giving 2 for lunch increase to 4 units   +      Sliding scale Humalog     1 unit for every 50 above 150        Uses dexcom data to help adjust insulin dosage             glipizide 10 mg one daily --stopped due to hypoglycemia               Goals for sugar     Fasting and before meals 80 to 130     2 hours after meals 180 or less        Aim for 45 grams of carbohydrate per meal     Aim for 15 grams of carbohydrate per snack               Called in GVOKE      Dexcom has allowed the patient to minimize hypoglycemia as alarms have predicted and avoided them     She also uses the arrows on the  dexcom as follows:     If arrow is horizontal , she uses the predicted dosage      If the arrow is slanted up or down-- she increase or decrease by 2 units     If the arrow is vertical up or down - she increases or decreases by 4  unit      Microvascular Complications Monitoring         Last eye exam--Dec. 2021 , no DR      Neuropathy -yes         Taking  gabapentin 300 mg          Lipid Management:         Taking tricor 145 mg daily               Blood Pressure Management:        Taking labetalol 300 mg      Taking losartan 100 mg daily      Taking Norvasc 5 mg daily                Thyroid Health     Hypothyroidism      Taking levothyroxine 88 mcg daily         June 2021      TSH - 3       Lab Results   Component Value Date    TSH 2.290 03/11/2022          Bone Health         Taking vitamin d supplement            Weight Management:     Patient's Body mass index is 26.47 kg/m². indicating that she is overweight (BMI 25-29.9). Patient's (Body mass index is 26.47 kg/m².) indicates that they are overweight with health conditions that include diabetes mellitus . Weight is unchanged. BMI is is above average; no BMI management plan is appropriate. We discussed portion control and increasing exercise. .            Preventive Care:      Non smoker                    Follow Up   No follow-ups on file.  Patient was given instructions and counseling regarding her condition or for health maintenance advice. Please see specific information pulled into the AVS if appropriate.         This document has been electronically signed by BARB Trotter on March 18, 2022 16:22 CDT.

## 2022-03-29 ENCOUNTER — TELEPHONE (OUTPATIENT)
Dept: ENDOCRINOLOGY | Facility: CLINIC | Age: 72
End: 2022-03-29

## 2022-03-29 NOTE — TELEPHONE ENCOUNTER
Pt wanted to inform our office that she was mailing paperwork for Trulicity, Novolog and Lantus tomorrow, so FRANK      Thanks

## 2022-04-07 ENCOUNTER — TELEPHONE (OUTPATIENT)
Dept: ENDOCRINOLOGY | Facility: CLINIC | Age: 72
End: 2022-04-07

## 2022-04-11 RX ORDER — INSULIN ASPART 100 [IU]/ML
INJECTION, SOLUTION INTRAVENOUS; SUBCUTANEOUS
Qty: 3 PEN | Refills: 11 | Status: SHIPPED | OUTPATIENT
Start: 2022-04-11 | End: 2022-04-19

## 2022-04-13 ENCOUNTER — TELEPHONE (OUTPATIENT)
Dept: ENDOCRINOLOGY | Facility: CLINIC | Age: 72
End: 2022-04-13

## 2022-04-13 DIAGNOSIS — Z79.4 TYPE 2 DIABETES MELLITUS WITH HYPOGLYCEMIA WITHOUT COMA, WITH LONG-TERM CURRENT USE OF INSULIN: Primary | ICD-10-CM

## 2022-04-13 DIAGNOSIS — E11.649 TYPE 2 DIABETES MELLITUS WITH HYPOGLYCEMIA WITHOUT COMA, WITH LONG-TERM CURRENT USE OF INSULIN: Primary | ICD-10-CM

## 2022-04-13 NOTE — TELEPHONE ENCOUNTER
Pt wants script for Jardiance 25mg sent to [facility]. Pt also asked if we had received her paperwork for [facility]? Please call pt to inform      Thanks

## 2022-04-14 ENCOUNTER — DOCUMENTATION (OUTPATIENT)
Dept: ENDOCRINOLOGY | Facility: CLINIC | Age: 72
End: 2022-04-14

## 2022-04-18 ENCOUNTER — TELEPHONE (OUTPATIENT)
Dept: ENDOCRINOLOGY | Facility: CLINIC | Age: 72
End: 2022-04-18

## 2022-04-19 ENCOUNTER — TELEPHONE (OUTPATIENT)
Dept: ENDOCRINOLOGY | Facility: CLINIC | Age: 72
End: 2022-04-19

## 2022-04-19 ENCOUNTER — DOCUMENTATION (OUTPATIENT)
Dept: ENDOCRINOLOGY | Facility: CLINIC | Age: 72
End: 2022-04-19

## 2022-04-19 RX ORDER — INSULIN ASPART 100 [IU]/ML
INJECTION, SOLUTION INTRAVENOUS; SUBCUTANEOUS
Qty: 3 PEN | Refills: 11 | Status: SHIPPED | OUTPATIENT
Start: 2022-04-19 | End: 2022-04-20 | Stop reason: SDUPTHER

## 2022-04-19 NOTE — TELEPHONE ENCOUNTER
Please resend script for Novolog FlexPen to [facility] in [place]. They never received it from 4/11. PT WILL BE OUT SOON.    Pt also states she received papers from [employer] that Trulicity was approved and she will receive it through them.       Thanks

## 2022-04-19 NOTE — PROGRESS NOTES
[facility] PAPERWORK SENT TO [facility]     CONFIRMATION RECEIVED AND SENT TO MR

## 2022-04-20 ENCOUNTER — TELEPHONE (OUTPATIENT)
Dept: ENDOCRINOLOGY | Facility: CLINIC | Age: 72
End: 2022-04-20

## 2022-04-20 RX ORDER — INSULIN ASPART 100 [IU]/ML
INJECTION, SOLUTION INTRAVENOUS; SUBCUTANEOUS
Qty: 9 PEN | Refills: 3 | Status: SHIPPED | OUTPATIENT
Start: 2022-04-20 | End: 2022-04-22 | Stop reason: SDUPTHER

## 2022-04-20 NOTE — TELEPHONE ENCOUNTER
"Pt says she has a \"voucher\" and [facility] will give her 2 boxes of the Novolog FlexPen for free, but they need the script sent in. PLEASE SEND NEW SCRIPT FOR 2 BOXES OF NOVOLOG FLEXPEN TO [facility] IN [place].      THANKS   "

## 2022-04-22 DIAGNOSIS — E11.649 TYPE 2 DIABETES MELLITUS WITH HYPOGLYCEMIA WITHOUT COMA, WITH LONG-TERM CURRENT USE OF INSULIN: Primary | ICD-10-CM

## 2022-04-22 DIAGNOSIS — Z79.4 TYPE 2 DIABETES MELLITUS WITH HYPOGLYCEMIA WITHOUT COMA, WITH LONG-TERM CURRENT USE OF INSULIN: Primary | ICD-10-CM

## 2022-04-22 RX ORDER — INSULIN ASPART 100 [IU]/ML
INJECTION, SOLUTION INTRAVENOUS; SUBCUTANEOUS
Qty: 10 PEN | Refills: 3 | Status: SHIPPED | OUTPATIENT
Start: 2022-04-22 | End: 2022-05-23 | Stop reason: SDUPTHER

## 2022-04-28 ENCOUNTER — TELEPHONE (OUTPATIENT)
Dept: ENDOCRINOLOGY | Facility: CLINIC | Age: 72
End: 2022-04-28

## 2022-05-17 RX ORDER — INSULIN GLARGINE 100 [IU]/ML
35 INJECTION, SOLUTION SUBCUTANEOUS DAILY
Qty: 15 PEN | Refills: 4 | Status: SHIPPED | OUTPATIENT
Start: 2022-05-17

## 2022-05-23 ENCOUNTER — DOCUMENTATION (OUTPATIENT)
Dept: ENDOCRINOLOGY | Facility: CLINIC | Age: 72
End: 2022-05-23

## 2022-05-23 DIAGNOSIS — Z79.4 TYPE 2 DIABETES MELLITUS WITH HYPOGLYCEMIA WITHOUT COMA, WITH LONG-TERM CURRENT USE OF INSULIN: ICD-10-CM

## 2022-05-23 DIAGNOSIS — E11.649 TYPE 2 DIABETES MELLITUS WITH HYPOGLYCEMIA WITHOUT COMA, WITH LONG-TERM CURRENT USE OF INSULIN: ICD-10-CM

## 2022-05-23 RX ORDER — INSULIN ASPART 100 [IU]/ML
INJECTION, SOLUTION INTRAVENOUS; SUBCUTANEOUS
Qty: 10 PEN | Refills: 3 | Status: SHIPPED | OUTPATIENT
Start: 2022-05-23

## 2022-05-25 ENCOUNTER — DOCUMENTATION (OUTPATIENT)
Dept: ENDOCRINOLOGY | Facility: CLINIC | Age: 72
End: 2022-05-25

## 2022-05-25 NOTE — PROGRESS NOTES
[employer] PAPERWORK FOR PT ASSISTANCE SENT TO COMPANY WITH RX FOR NOVOLOG    CONFIRMATION RECEIVED     SENT TO MR

## 2022-05-31 ENCOUNTER — TELEPHONE (OUTPATIENT)
Dept: ENDOCRINOLOGY | Facility: CLINIC | Age: 72
End: 2022-05-31

## 2022-06-15 ENCOUNTER — LAB (OUTPATIENT)
Dept: LAB | Facility: HOSPITAL | Age: 72
End: 2022-06-15

## 2022-06-15 DIAGNOSIS — E78.2 MIXED HYPERLIPIDEMIA: ICD-10-CM

## 2022-06-15 DIAGNOSIS — E11.649 TYPE 2 DIABETES MELLITUS WITH HYPOGLYCEMIA WITHOUT COMA, WITH LONG-TERM CURRENT USE OF INSULIN: ICD-10-CM

## 2022-06-15 DIAGNOSIS — E03.9 ACQUIRED HYPOTHYROIDISM: ICD-10-CM

## 2022-06-15 DIAGNOSIS — I10 ESSENTIAL HYPERTENSION: ICD-10-CM

## 2022-06-15 DIAGNOSIS — Z79.4 TYPE 2 DIABETES MELLITUS WITH HYPOGLYCEMIA WITHOUT COMA, WITH LONG-TERM CURRENT USE OF INSULIN: ICD-10-CM

## 2022-06-15 DIAGNOSIS — E55.9 VITAMIN D DEFICIENCY, UNSPECIFIED: ICD-10-CM

## 2022-06-15 LAB
25(OH)D3 SERPL-MCNC: 76.1 NG/ML (ref 30–100)
ALBUMIN SERPL-MCNC: 4 G/DL (ref 3.5–5.2)
ALBUMIN UR-MCNC: <1.2 MG/DL
ALBUMIN/GLOB SERPL: 1.3 G/DL
ALP SERPL-CCNC: 36 U/L (ref 39–117)
ALT SERPL W P-5'-P-CCNC: 19 U/L (ref 1–33)
ANION GAP SERPL CALCULATED.3IONS-SCNC: 11 MMOL/L (ref 5–15)
AST SERPL-CCNC: 28 U/L (ref 1–32)
BASOPHILS # BLD AUTO: 0.05 10*3/MM3 (ref 0–0.2)
BASOPHILS NFR BLD AUTO: 0.8 % (ref 0–1.5)
BILIRUB SERPL-MCNC: 0.5 MG/DL (ref 0–1.2)
BUN SERPL-MCNC: 51 MG/DL (ref 8–23)
BUN/CREAT SERPL: 27 (ref 7–25)
CALCIUM SPEC-SCNC: 9.3 MG/DL (ref 8.6–10.5)
CHLORIDE SERPL-SCNC: 107 MMOL/L (ref 98–107)
CHOLEST SERPL-MCNC: 133 MG/DL (ref 0–200)
CO2 SERPL-SCNC: 25 MMOL/L (ref 22–29)
CREAT SERPL-MCNC: 1.89 MG/DL (ref 0.57–1)
CREAT UR-MCNC: 24.3 MG/DL
DEPRECATED RDW RBC AUTO: 47.5 FL (ref 37–54)
EGFRCR SERPLBLD CKD-EPI 2021: 27.8 ML/MIN/1.73
EOSINOPHIL # BLD AUTO: 0.37 10*3/MM3 (ref 0–0.4)
EOSINOPHIL NFR BLD AUTO: 5.9 % (ref 0.3–6.2)
ERYTHROCYTE [DISTWIDTH] IN BLOOD BY AUTOMATED COUNT: 13.7 % (ref 12.3–15.4)
GLOBULIN UR ELPH-MCNC: 3 GM/DL
GLUCOSE SERPL-MCNC: 127 MG/DL (ref 65–99)
HBA1C MFR BLD: 6.8 % (ref 4.8–5.6)
HCT VFR BLD AUTO: 34.3 % (ref 34–46.6)
HDLC SERPL-MCNC: 63 MG/DL (ref 40–60)
HGB BLD-MCNC: 10.6 G/DL (ref 12–15.9)
IMM GRANULOCYTES # BLD AUTO: 0.01 10*3/MM3 (ref 0–0.05)
IMM GRANULOCYTES NFR BLD AUTO: 0.2 % (ref 0–0.5)
LDLC SERPL CALC-MCNC: 58 MG/DL (ref 0–100)
LDLC/HDLC SERPL: 0.94 {RATIO}
LYMPHOCYTES # BLD AUTO: 1.84 10*3/MM3 (ref 0.7–3.1)
LYMPHOCYTES NFR BLD AUTO: 29.5 % (ref 19.6–45.3)
MCH RBC QN AUTO: 29.1 PG (ref 26.6–33)
MCHC RBC AUTO-ENTMCNC: 30.9 G/DL (ref 31.5–35.7)
MCV RBC AUTO: 94.2 FL (ref 79–97)
MICROALBUMIN/CREAT UR: NORMAL MG/G{CREAT}
MONOCYTES # BLD AUTO: 0.44 10*3/MM3 (ref 0.1–0.9)
MONOCYTES NFR BLD AUTO: 7.1 % (ref 5–12)
NEUTROPHILS NFR BLD AUTO: 3.52 10*3/MM3 (ref 1.7–7)
NEUTROPHILS NFR BLD AUTO: 56.5 % (ref 42.7–76)
NRBC BLD AUTO-RTO: 0 /100 WBC (ref 0–0.2)
PLATELET # BLD AUTO: 255 10*3/MM3 (ref 140–450)
PMV BLD AUTO: 11 FL (ref 6–12)
POTASSIUM SERPL-SCNC: 4.5 MMOL/L (ref 3.5–5.2)
PROT SERPL-MCNC: 7 G/DL (ref 6–8.5)
RBC # BLD AUTO: 3.64 10*6/MM3 (ref 3.77–5.28)
SODIUM SERPL-SCNC: 143 MMOL/L (ref 136–145)
TRIGL SERPL-MCNC: 53 MG/DL (ref 0–150)
TSH SERPL DL<=0.05 MIU/L-ACNC: 1.77 UIU/ML (ref 0.27–4.2)
VLDLC SERPL-MCNC: 12 MG/DL (ref 5–40)
WBC NRBC COR # BLD: 6.23 10*3/MM3 (ref 3.4–10.8)

## 2022-06-15 PROCEDURE — 82570 ASSAY OF URINE CREATININE: CPT

## 2022-06-15 PROCEDURE — 83036 HEMOGLOBIN GLYCOSYLATED A1C: CPT

## 2022-06-15 PROCEDURE — 82306 VITAMIN D 25 HYDROXY: CPT

## 2022-06-15 PROCEDURE — 82043 UR ALBUMIN QUANTITATIVE: CPT

## 2022-06-15 PROCEDURE — 80061 LIPID PANEL: CPT

## 2022-06-15 PROCEDURE — 84443 ASSAY THYROID STIM HORMONE: CPT

## 2022-06-15 PROCEDURE — 36415 COLL VENOUS BLD VENIPUNCTURE: CPT

## 2022-06-15 PROCEDURE — 80053 COMPREHEN METABOLIC PANEL: CPT

## 2022-06-15 PROCEDURE — 85025 COMPLETE CBC W/AUTO DIFF WBC: CPT

## 2022-06-21 ENCOUNTER — OFFICE VISIT (OUTPATIENT)
Dept: ENDOCRINOLOGY | Facility: CLINIC | Age: 72
End: 2022-06-21

## 2022-06-21 VITALS
DIASTOLIC BLOOD PRESSURE: 84 MMHG | HEART RATE: 80 BPM | OXYGEN SATURATION: 97 % | BODY MASS INDEX: 27.53 KG/M2 | SYSTOLIC BLOOD PRESSURE: 130 MMHG | HEIGHT: 61 IN | WEIGHT: 145.8 LBS

## 2022-06-21 DIAGNOSIS — Z79.4 TYPE 2 DIABETES MELLITUS WITH HYPERGLYCEMIA, WITH LONG-TERM CURRENT USE OF INSULIN: ICD-10-CM

## 2022-06-21 DIAGNOSIS — E78.2 MIXED HYPERLIPIDEMIA: ICD-10-CM

## 2022-06-21 DIAGNOSIS — I10 ESSENTIAL HYPERTENSION: ICD-10-CM

## 2022-06-21 DIAGNOSIS — E03.9 ACQUIRED HYPOTHYROIDISM: ICD-10-CM

## 2022-06-21 DIAGNOSIS — D64.9 ANEMIA, UNSPECIFIED TYPE: Primary | ICD-10-CM

## 2022-06-21 DIAGNOSIS — E11.65 TYPE 2 DIABETES MELLITUS WITH HYPERGLYCEMIA, WITH LONG-TERM CURRENT USE OF INSULIN: ICD-10-CM

## 2022-06-21 PROCEDURE — 99214 OFFICE O/P EST MOD 30 MIN: CPT | Performed by: NURSE PRACTITIONER

## 2022-06-21 PROCEDURE — 95251 CONT GLUC MNTR ANALYSIS I&R: CPT | Performed by: NURSE PRACTITIONER

## 2022-06-21 NOTE — PROGRESS NOTES
"Chief Complaint  Diabetes    Subjective          Kathryn Wray presents to [facility] ENDOCRIN[facility]  History of Present Illness      In office visit       Primary care provider Dr. Tom     71-year-old female presents for follow-up     Reason diabetes mellitus type 2     Timing is constant     Quality improved control     Duration diagnosed at age 38     Context diagnosed through routine lab work            Severity is high               Microvascular complications ---neuropathy, and renal disease      Current diabetes regimen      Oral medications, insulin , GLP-1     Current glucose monitoring            Checking 4 times daily and has been for over 90 days         Has the DEXCOM G6      Downloaded below        Review of Systems - General ROS: negative         Objective   Vital Signs:   /84   Pulse 80   Ht 154.9 cm (61\")   Wt 66.1 kg (145 lb 12.8 oz)   SpO2 97%   BMI 27.55 kg/m²     Physical Exam  Constitutional:       Appearance: Normal appearance.   Cardiovascular:      Rate and Rhythm: Regular rhythm.      Heart sounds: Normal heart sounds.   Pulmonary:      Breath sounds: Normal breath sounds.   Musculoskeletal:      Cervical back: Normal range of motion.   Neurological:      Mental Status: She is alert.        Result Review :   The following data was reviewed by: BARB Trotter on 06/21/2022:  Common labs    Common Labsle 11/12/21 11/12/21 11/12/21 11/12/21 11/12/21 3/11/22 3/11/22 3/11/22 3/11/22 6/15/22 6/15/22 6/15/22 6/15/22 6/15/22    1452 1452 1452 1452 1452 1502 1502 1502 1502 1143 1143 1143 1143 1143   Glucose  231 (A)      167 (A)    127 (A)     BUN  43 (A)      40 (A)    51 (A)     Creatinine  1.41 (A)      1.47 (A)    1.89 (A)     eGFR Non  Am  37 (A)               Sodium  141      141    143     Potassium  4.6      4.1    4.5     Chloride  103      103    107     Calcium  10.0      9.7    9.3     Albumin  4.40      4.30    4.00   "   Total Bilirubin  0.4      0.5    0.5     Alkaline Phosphatase  48      36 (A)    36 (A)     AST (SGOT)  42 (A)      34 (A)    28     ALT (SGPT)  34 (A)      22    19     WBC 6.42     7.54    6.23       Hemoglobin 11.3 (A)     11.6 (A)    10.6 (A)       Hematocrit 35.9     36.5    34.3       Platelets 257     277    255       Total Cholesterol    156     152    133    Triglycerides    95     83    53    HDL Cholesterol    67 (A)     73 (A)    63 (A)    LDL Cholesterol     72     63    58    Hemoglobin A1C   6.87 (A)    7.30 (A)    6.80 (A)      Microalbumin, Urine     1.9         <1.2   (A) Abnormal value                      Assessment and Plan    Diagnoses and all orders for this visit:    1. Anemia, unspecified type (Primary)  -     CBC & Differential  -     Iron Profile    2. Type 2 diabetes mellitus with hyperglycemia, with long-term current use of insulin (HCC)    3. Acquired hypothyroidism    4. Essential hypertension    5. Mixed hyperlipidemia             Glycemic Management:        Diabetes mellitus type 2         Lab Results   Component Value Date    HGBA1C 6.80 (H) 06/15/2022             Dexcom downloaded and reviewed    Dated from June 8 t0 June 21, 2022     Average bg 154     Time in target 73 %     High 21 %     Very high 4%     Low 1 %     Very low less than 1 %              Changes she is at goal and in target 73%    Occasional postprandial hyperglycemia but no changes to current regimen           jardiance 10 mg once daily                  Taking  10 units            Taking Trulicity 1.5 mg weekly      Side effects can include nausea, if nausea occurs while you are eating stop eating the medicine is telling you you are full     If you have vomiting and abdominal pain stop medication and call the office           Humalog injecting TID before each meal      2 up to 4  units with meals TID before each meal     Giving 2 for lunch increase to 4 units   +      Sliding scale Humalog     1 unit for every  50 above 150        Uses dexcom data to help adjust insulin dosage             glipizide 10 mg one daily --stopped due to hypoglycemia               Goals for sugar     Fasting and before meals 80 to 130     2 hours after meals 180 or less        Aim for 45 grams of carbohydrate per meal     Aim for 15 grams of carbohydrate per snack               Called in GVOKE      Dexcom has allowed the patient to minimize hypoglycemia as alarms have predicted and avoided them     She also uses the arrows on the dexcom as follows:     If arrow is horizontal , she uses the predicted dosage      If the arrow is slanted up or down-- she increase or decrease by 2 units     If the arrow is vertical up or down - she increases or decreases by 4  unit      Microvascular Complications Monitoring         Last eye exam--Dec. 2021 , no DR      Neuropathy -yes         Taking  gabapentin 300 mg          Lipid Management:         Taking tricor 145 mg daily               Blood Pressure Management:        Taking labetalol 300 mg      Taking losartan 100 mg daily      Taking Norvasc 5 mg daily                  Thyroid Health     Hypothyroidism      Taking levothyroxine 88 mcg daily                   Lab Results   Component Value Date    TSH 1.770 06/15/2022             Bone Health         Taking vitamin d supplement            Weight Management:               Preventive Care:      Non smoker                          Follow Up   Return in about 3 months (around 9/21/2022).  Patient was given instructions and counseling regarding her condition or for health maintenance advice. Please see specific information pulled into the AVS if appropriate.         This document has been electronically signed by BARB Trotter on June 21, 2022 17:04 CDT.

## 2022-07-19 ENCOUNTER — LAB (OUTPATIENT)
Dept: LAB | Facility: HOSPITAL | Age: 72
End: 2022-07-19

## 2022-07-19 LAB
BASOPHILS # BLD AUTO: 0.05 10*3/MM3 (ref 0–0.2)
BASOPHILS NFR BLD AUTO: 0.8 % (ref 0–1.5)
DEPRECATED RDW RBC AUTO: 46.7 FL (ref 37–54)
EOSINOPHIL # BLD AUTO: 0.4 10*3/MM3 (ref 0–0.4)
EOSINOPHIL NFR BLD AUTO: 6 % (ref 0.3–6.2)
ERYTHROCYTE [DISTWIDTH] IN BLOOD BY AUTOMATED COUNT: 13.7 % (ref 12.3–15.4)
HCT VFR BLD AUTO: 34.9 % (ref 34–46.6)
HGB BLD-MCNC: 11 G/DL (ref 12–15.9)
IMM GRANULOCYTES # BLD AUTO: 0.02 10*3/MM3 (ref 0–0.05)
IMM GRANULOCYTES NFR BLD AUTO: 0.3 % (ref 0–0.5)
IRON 24H UR-MRATE: 81 MCG/DL (ref 37–145)
IRON SATN MFR SERPL: 16 % (ref 20–50)
LYMPHOCYTES # BLD AUTO: 1.88 10*3/MM3 (ref 0.7–3.1)
LYMPHOCYTES NFR BLD AUTO: 28.3 % (ref 19.6–45.3)
MCH RBC QN AUTO: 29.3 PG (ref 26.6–33)
MCHC RBC AUTO-ENTMCNC: 31.5 G/DL (ref 31.5–35.7)
MCV RBC AUTO: 93.1 FL (ref 79–97)
MONOCYTES # BLD AUTO: 0.51 10*3/MM3 (ref 0.1–0.9)
MONOCYTES NFR BLD AUTO: 7.7 % (ref 5–12)
NEUTROPHILS NFR BLD AUTO: 3.78 10*3/MM3 (ref 1.7–7)
NEUTROPHILS NFR BLD AUTO: 56.9 % (ref 42.7–76)
NRBC BLD AUTO-RTO: 0 /100 WBC (ref 0–0.2)
PLATELET # BLD AUTO: 233 10*3/MM3 (ref 140–450)
PMV BLD AUTO: 10.8 FL (ref 6–12)
RBC # BLD AUTO: 3.75 10*6/MM3 (ref 3.77–5.28)
TIBC SERPL-MCNC: 499 MCG/DL (ref 298–536)
TRANSFERRIN SERPL-MCNC: 335 MG/DL (ref 200–360)
WBC NRBC COR # BLD: 6.64 10*3/MM3 (ref 3.4–10.8)

## 2022-07-19 PROCEDURE — 36415 COLL VENOUS BLD VENIPUNCTURE: CPT | Performed by: NURSE PRACTITIONER

## 2022-07-19 PROCEDURE — 85025 COMPLETE CBC W/AUTO DIFF WBC: CPT | Performed by: NURSE PRACTITIONER

## 2022-07-19 PROCEDURE — 83540 ASSAY OF IRON: CPT | Performed by: NURSE PRACTITIONER

## 2022-07-19 PROCEDURE — 84466 ASSAY OF TRANSFERRIN: CPT | Performed by: NURSE PRACTITIONER

## 2022-08-11 ENCOUNTER — DOCUMENTATION (OUTPATIENT)
Dept: ENDOCRINOLOGY | Facility: CLINIC | Age: 72
End: 2022-08-11

## 2022-08-24 ENCOUNTER — TELEPHONE (OUTPATIENT)
Dept: ENDOCRINOLOGY | Facility: CLINIC | Age: 72
End: 2022-08-24

## 2022-08-24 NOTE — TELEPHONE ENCOUNTER
Patient called stating her Novolog Flexpen was mailed to our office by accident. She spoke with someone that told her they mailed it on 8/22/2022. The patient wants someone to call her when we receive the medication.     Please Advise    Thanks

## 2022-08-25 ENCOUNTER — DOCUMENTATION (OUTPATIENT)
Dept: ENDOCRINOLOGY | Facility: CLINIC | Age: 72
End: 2022-08-25

## 2022-08-25 ENCOUNTER — TELEPHONE (OUTPATIENT)
Dept: ENDOCRINOLOGY | Facility: CLINIC | Age: 72
End: 2022-08-25

## 2022-08-25 NOTE — TELEPHONE ENCOUNTER
Patient called again. She said her insurance company sent her needles here in June. She wants to know if you have them?

## 2022-08-25 NOTE — TELEPHONE ENCOUNTER
Pt called and wanted to know if her Novalog had been delivered here yet. She said it had shipped out on Monday and is usually sent over night.    Thanks     Pt called back and said to disregard the previous message. She said she spoke with the company about her Novalog and it is being sent to her PCP office in [place], so you no longer have to look into this.    Thanks

## 2022-09-01 ENCOUNTER — TELEPHONE (OUTPATIENT)
Dept: ENDOCRINOLOGY | Facility: CLINIC | Age: 72
End: 2022-09-01

## 2022-09-01 NOTE — TELEPHONE ENCOUNTER
Pt wants to know if you have any jardiance samples? If so can you give them to her sister Conchis. She is picking up patients needles today.

## 2022-09-27 ENCOUNTER — LAB (OUTPATIENT)
Dept: LAB | Facility: HOSPITAL | Age: 72
End: 2022-09-27

## 2022-09-27 DIAGNOSIS — E78.2 MIXED HYPERLIPIDEMIA: ICD-10-CM

## 2022-09-27 DIAGNOSIS — E03.9 ACQUIRED HYPOTHYROIDISM: ICD-10-CM

## 2022-09-27 DIAGNOSIS — E11.649 TYPE 2 DIABETES MELLITUS WITH HYPOGLYCEMIA WITHOUT COMA, WITH LONG-TERM CURRENT USE OF INSULIN: ICD-10-CM

## 2022-09-27 DIAGNOSIS — Z79.4 TYPE 2 DIABETES MELLITUS WITH HYPOGLYCEMIA WITHOUT COMA, WITH LONG-TERM CURRENT USE OF INSULIN: ICD-10-CM

## 2022-09-27 DIAGNOSIS — D64.9 ANEMIA, UNSPECIFIED TYPE: ICD-10-CM

## 2022-09-27 DIAGNOSIS — E11.649 TYPE 2 DIABETES MELLITUS WITH HYPOGLYCEMIA WITHOUT COMA, WITH LONG-TERM CURRENT USE OF INSULIN: Primary | ICD-10-CM

## 2022-09-27 DIAGNOSIS — E55.9 VITAMIN D DEFICIENCY: ICD-10-CM

## 2022-09-27 DIAGNOSIS — Z79.4 TYPE 2 DIABETES MELLITUS WITH HYPOGLYCEMIA WITHOUT COMA, WITH LONG-TERM CURRENT USE OF INSULIN: Primary | ICD-10-CM

## 2022-09-27 LAB
ALBUMIN SERPL-MCNC: 4.7 G/DL (ref 3.5–5.2)
ALBUMIN/GLOB SERPL: 1.5 G/DL
ALP SERPL-CCNC: 42 U/L (ref 39–117)
ALT SERPL W P-5'-P-CCNC: 18 U/L (ref 1–33)
ANION GAP SERPL CALCULATED.3IONS-SCNC: 11 MMOL/L (ref 5–15)
AST SERPL-CCNC: 29 U/L (ref 1–32)
BASOPHILS # BLD AUTO: 0.06 10*3/MM3 (ref 0–0.2)
BASOPHILS NFR BLD AUTO: 0.8 % (ref 0–1.5)
BILIRUB SERPL-MCNC: 0.4 MG/DL (ref 0–1.2)
BUN SERPL-MCNC: 38 MG/DL (ref 8–23)
BUN/CREAT SERPL: 27 (ref 7–25)
CALCIUM SPEC-SCNC: 9.8 MG/DL (ref 8.6–10.5)
CHLORIDE SERPL-SCNC: 104 MMOL/L (ref 98–107)
CHOLEST SERPL-MCNC: 162 MG/DL (ref 0–200)
CO2 SERPL-SCNC: 27 MMOL/L (ref 22–29)
CREAT SERPL-MCNC: 1.41 MG/DL (ref 0.57–1)
DEPRECATED RDW RBC AUTO: 46.5 FL (ref 37–54)
EGFRCR SERPLBLD CKD-EPI 2021: 39.2 ML/MIN/1.73
EOSINOPHIL # BLD AUTO: 0.42 10*3/MM3 (ref 0–0.4)
EOSINOPHIL NFR BLD AUTO: 5.3 % (ref 0.3–6.2)
ERYTHROCYTE [DISTWIDTH] IN BLOOD BY AUTOMATED COUNT: 13.7 % (ref 12.3–15.4)
GLOBULIN UR ELPH-MCNC: 3.1 GM/DL
GLUCOSE SERPL-MCNC: 99 MG/DL (ref 65–99)
HBA1C MFR BLD: 6.9 % (ref 4.8–5.6)
HCT VFR BLD AUTO: 38.4 % (ref 34–46.6)
HDLC SERPL-MCNC: 75 MG/DL (ref 40–60)
HGB BLD-MCNC: 12.3 G/DL (ref 12–15.9)
IMM GRANULOCYTES # BLD AUTO: 0.02 10*3/MM3 (ref 0–0.05)
IMM GRANULOCYTES NFR BLD AUTO: 0.3 % (ref 0–0.5)
LDLC SERPL CALC-MCNC: 75 MG/DL (ref 0–100)
LDLC/HDLC SERPL: 1.01 {RATIO}
LYMPHOCYTES # BLD AUTO: 2.45 10*3/MM3 (ref 0.7–3.1)
LYMPHOCYTES NFR BLD AUTO: 30.7 % (ref 19.6–45.3)
MCH RBC QN AUTO: 29.8 PG (ref 26.6–33)
MCHC RBC AUTO-ENTMCNC: 32 G/DL (ref 31.5–35.7)
MCV RBC AUTO: 93 FL (ref 79–97)
MONOCYTES # BLD AUTO: 0.47 10*3/MM3 (ref 0.1–0.9)
MONOCYTES NFR BLD AUTO: 5.9 % (ref 5–12)
NEUTROPHILS NFR BLD AUTO: 4.55 10*3/MM3 (ref 1.7–7)
NEUTROPHILS NFR BLD AUTO: 57 % (ref 42.7–76)
NRBC BLD AUTO-RTO: 0 /100 WBC (ref 0–0.2)
PLATELET # BLD AUTO: 224 10*3/MM3 (ref 140–450)
PMV BLD AUTO: 10.9 FL (ref 6–12)
POTASSIUM SERPL-SCNC: 4 MMOL/L (ref 3.5–5.2)
PROT SERPL-MCNC: 7.8 G/DL (ref 6–8.5)
RBC # BLD AUTO: 4.13 10*6/MM3 (ref 3.77–5.28)
SODIUM SERPL-SCNC: 142 MMOL/L (ref 136–145)
TRIGL SERPL-MCNC: 58 MG/DL (ref 0–150)
TSH SERPL DL<=0.05 MIU/L-ACNC: 2.61 UIU/ML (ref 0.27–4.2)
VLDLC SERPL-MCNC: 12 MG/DL (ref 5–40)
WBC NRBC COR # BLD: 7.97 10*3/MM3 (ref 3.4–10.8)

## 2022-09-27 PROCEDURE — 85025 COMPLETE CBC W/AUTO DIFF WBC: CPT

## 2022-09-27 PROCEDURE — 82306 VITAMIN D 25 HYDROXY: CPT

## 2022-09-27 PROCEDURE — 82728 ASSAY OF FERRITIN: CPT | Performed by: INTERNAL MEDICINE

## 2022-09-27 PROCEDURE — 82043 UR ALBUMIN QUANTITATIVE: CPT

## 2022-09-27 PROCEDURE — 80061 LIPID PANEL: CPT | Performed by: INTERNAL MEDICINE

## 2022-09-27 PROCEDURE — 84466 ASSAY OF TRANSFERRIN: CPT | Performed by: INTERNAL MEDICINE

## 2022-09-27 PROCEDURE — 82607 VITAMIN B-12: CPT | Performed by: INTERNAL MEDICINE

## 2022-09-27 PROCEDURE — 83036 HEMOGLOBIN GLYCOSYLATED A1C: CPT | Performed by: INTERNAL MEDICINE

## 2022-09-27 PROCEDURE — 80053 COMPREHEN METABOLIC PANEL: CPT | Performed by: INTERNAL MEDICINE

## 2022-09-27 PROCEDURE — 36415 COLL VENOUS BLD VENIPUNCTURE: CPT | Performed by: INTERNAL MEDICINE

## 2022-09-27 PROCEDURE — 82570 ASSAY OF URINE CREATININE: CPT

## 2022-09-27 PROCEDURE — 83540 ASSAY OF IRON: CPT | Performed by: INTERNAL MEDICINE

## 2022-09-27 PROCEDURE — 84443 ASSAY THYROID STIM HORMONE: CPT | Performed by: INTERNAL MEDICINE

## 2022-09-28 LAB
25(OH)D3 SERPL-MCNC: 84 NG/ML (ref 30–100)
ALBUMIN UR-MCNC: 2.6 MG/DL
CREAT UR-MCNC: 36.2 MG/DL
FERRITIN SERPL-MCNC: 78.8 NG/ML (ref 13–150)
IRON 24H UR-MRATE: 66 MCG/DL (ref 37–145)
IRON SATN MFR SERPL: 11 % (ref 20–50)
MICROALBUMIN/CREAT UR: 71.8 MG/G
TIBC SERPL-MCNC: 617 MCG/DL (ref 298–536)
TRANSFERRIN SERPL-MCNC: 414 MG/DL (ref 200–360)
VIT B12 BLD-MCNC: 337 PG/ML (ref 211–946)

## 2022-10-07 ENCOUNTER — OFFICE VISIT (OUTPATIENT)
Dept: ENDOCRINOLOGY | Facility: CLINIC | Age: 72
End: 2022-10-07

## 2022-10-07 VITALS
HEIGHT: 61 IN | HEART RATE: 72 BPM | RESPIRATION RATE: 18 BRPM | SYSTOLIC BLOOD PRESSURE: 126 MMHG | WEIGHT: 139 LBS | BODY MASS INDEX: 26.24 KG/M2 | OXYGEN SATURATION: 98 % | DIASTOLIC BLOOD PRESSURE: 78 MMHG

## 2022-10-07 DIAGNOSIS — E78.2 MIXED HYPERLIPIDEMIA: ICD-10-CM

## 2022-10-07 DIAGNOSIS — E03.9 ACQUIRED HYPOTHYROIDISM: ICD-10-CM

## 2022-10-07 DIAGNOSIS — Z79.4 TYPE 2 DIABETES MELLITUS WITH HYPOGLYCEMIA WITHOUT COMA, WITH LONG-TERM CURRENT USE OF INSULIN: Primary | ICD-10-CM

## 2022-10-07 DIAGNOSIS — E55.9 VITAMIN D DEFICIENCY, UNSPECIFIED: ICD-10-CM

## 2022-10-07 DIAGNOSIS — I10 ESSENTIAL HYPERTENSION: ICD-10-CM

## 2022-10-07 DIAGNOSIS — E11.649 TYPE 2 DIABETES MELLITUS WITH HYPOGLYCEMIA WITHOUT COMA, WITH LONG-TERM CURRENT USE OF INSULIN: Primary | ICD-10-CM

## 2022-10-07 PROCEDURE — 99214 OFFICE O/P EST MOD 30 MIN: CPT | Performed by: NURSE PRACTITIONER

## 2022-10-07 RX ORDER — PHENAZOPYRIDINE HYDROCHLORIDE 100 MG/1
TABLET, FILM COATED ORAL
COMMUNITY
Start: 2022-08-17

## 2022-10-07 RX ORDER — TEMAZEPAM 30 MG/1
30 CAPSULE ORAL NIGHTLY PRN
COMMUNITY
Start: 2022-07-11

## 2022-10-07 RX ORDER — FUROSEMIDE 40 MG/1
40 TABLET ORAL 3 TIMES WEEKLY
COMMUNITY
Start: 2022-08-11

## 2022-10-07 RX ORDER — CITALOPRAM 20 MG/1
TABLET ORAL
COMMUNITY
Start: 2022-09-20

## 2022-10-07 NOTE — PROGRESS NOTES
"Chief Complaint  Diabetes and Follow-up (3 mo)    Subjective          Kathryn Wray presents to [facility] ENDOCRINOLOG[facility]  History of Present Illness        In office visit       Primary care provider Dr. Tom     71-year-old female presents for follow-up     Reason diabetes mellitus type 2     Timing is constant     Quality improved control     Duration diagnosed at age 38     Context diagnosed through routine lab work            Severity is high               Microvascular complications ---neuropathy, and renal disease      Current diabetes regimen      Oral medications, insulin , GLP-1     Current glucose monitoring            Checking 4 times daily and has been for over 90 days         Has the DEXCOM G6      Downloaded below        Review of Systems - General ROS: negative         Objective   Vital Signs:   /78   Pulse 72   Resp 18   Ht 154.9 cm (61\")   Wt 63 kg (139 lb)   SpO2 98%   BMI 26.26 kg/m²     Physical Exam  Constitutional:       Appearance: Normal appearance.   Cardiovascular:      Rate and Rhythm: Regular rhythm.      Heart sounds: Normal heart sounds.   Pulmonary:      Breath sounds: Normal breath sounds.   Musculoskeletal:      Cervical back: Normal range of motion.   Neurological:      Mental Status: She is alert.        Result Review :   The following data was reviewed by: BARB Trotter on 06/21/2022:  Common labs    Common Labs 6/15/22 6/15/22 6/15/22 6/15/22 6/15/22 7/19/22 9/27/22 9/27/22 9/27/22 9/27/22 9/27/22    1143 1143 1143 1143 1143  1627 1627 1627 1627 1627   Glucose   127 (A)      99     BUN   51 (A)      38 (A)     Creatinine   1.89 (A)      1.41 (A)     Sodium   143      142     Potassium   4.5      4.0     Chloride   107      104     Calcium   9.3      9.8     Albumin   4.00      4.70     Total Bilirubin   0.5      0.4     Alkaline Phosphatase   36 (A)      42     AST (SGOT)   28      29     ALT (SGPT)   19      18     WBC " 6.23     6.64 7.97       Hemoglobin 10.6 (A)     11.0 (A) 12.3       Hematocrit 34.3     34.9 38.4       Platelets 255     233 224       Total Cholesterol    133      162    Triglycerides    53      58    HDL Cholesterol    63 (A)      75 (A)    LDL Cholesterol     58      75    Hemoglobin A1C  6.80 (A)      6.90 (A)      Microalbumin, Urine     <1.2      2.6   (A) Abnormal value                        Assessment and Plan    Diagnoses and all orders for this visit:    1. Type 2 diabetes mellitus with hypoglycemia without coma, with long-term current use of insulin (HCC) (Primary)  -     CBC & Differential; Future  -     Comprehensive Metabolic Panel; Future  -     Hemoglobin A1c; Future  -     Lipid Panel; Future  -     Microalbumin / Creatinine Urine Ratio - Urine, Clean Catch; Future  -     TSH; Future  -     Vitamin D 25 Hydroxy; Future    2. Essential hypertension  -     CBC & Differential; Future  -     Comprehensive Metabolic Panel; Future  -     Hemoglobin A1c; Future  -     Lipid Panel; Future  -     Microalbumin / Creatinine Urine Ratio - Urine, Clean Catch; Future  -     TSH; Future  -     Vitamin D 25 Hydroxy; Future    3. Acquired hypothyroidism  -     CBC & Differential; Future  -     Comprehensive Metabolic Panel; Future  -     Hemoglobin A1c; Future  -     Lipid Panel; Future  -     Microalbumin / Creatinine Urine Ratio - Urine, Clean Catch; Future  -     TSH; Future  -     Vitamin D 25 Hydroxy; Future    4. Mixed hyperlipidemia  -     CBC & Differential; Future  -     Comprehensive Metabolic Panel; Future  -     Hemoglobin A1c; Future  -     Lipid Panel; Future  -     Microalbumin / Creatinine Urine Ratio - Urine, Clean Catch; Future  -     TSH; Future  -     Vitamin D 25 Hydroxy; Future    5. Vitamin D deficiency, unspecified   -     Vitamin D 25 Hydroxy; Future             Glycemic Management:        Diabetes mellitus type 2         Lab Results   Component Value Date    HGBA1C 6.90 (H) 09/27/2022              Dexcom downloaded and reviewed    Dated from Sept 24 to Oct. 7, 2022     Average bg 149    Time in target 78%    High 20%     Very high 1 %     Low less than 1%     Very low than 1%         No changes she is in target 78% the time    She has an occasional postprandial hyperglycemia with high carb meal otherwise she is doing fantastic             taking  jardiance 10 mg once daily                  Taking  Lantus 10 units            Taking Trulicity 1.5 mg weekly      Side effects can include nausea, if nausea occurs while you are eating stop eating the medicine is telling you you are full     If you have vomiting and abdominal pain stop medication and call the office           Humalog injecting TID before each meal      2 up to 4  units with meals TID before each meal     Giving 2 for lunch increase to 4 units   +      Sliding scale Humalog     1 unit for every 50 above 150        Uses dexcom data to help adjust insulin dosage             glipizide 10 mg one daily --stopped due to hypoglycemia               Goals for sugar     Fasting and before meals 80 to 130     2 hours after meals 180 or less        Aim for 45 grams of carbohydrate per meal     Aim for 15 grams of carbohydrate per snack               Called in GVOKE      Dexcom has allowed the patient to minimize hypoglycemia as alarms have predicted and avoided them     She also uses the arrows on the dexcom as follows:     If arrow is horizontal , she uses the predicted dosage      If the arrow is slanted up or down-- she increase or decrease by 2 units     If the arrow is vertical up or down - she increases or decreases by 4  unit      Microvascular Complications Monitoring         Last eye exam--Dec. 2021 , no DR      Neuropathy -yes         Taking  gabapentin 300 mg          Lipid Management:         Taking tricor 145 mg daily               Blood Pressure Management:        Taking labetalol 300 mg      Taking losartan 100 mg daily      Taking  Norvasc 5 mg daily                  Thyroid Health     Hypothyroidism      Taking levothyroxine 88 mcg daily                   Lab Results   Component Value Date    TSH 2.610 09/27/2022             Bone Health         Taking vitamin d supplement            Weight Management:    Body mass index is 26.26 kg/m².             Preventive Care:       Non smoker                          Follow Up   Return in about 3 months (around 1/7/2023).  Patient was given instructions and counseling regarding her condition or for health maintenance advice. Please see specific information pulled into the AVS if appropriate.         This document has been electronically signed by BARB Trotter on October 7, 2022 16:39 CDT.

## 2022-10-10 ENCOUNTER — TELEPHONE (OUTPATIENT)
Dept: ENDOCRINOLOGY | Facility: CLINIC | Age: 72
End: 2022-10-10

## 2022-10-10 NOTE — TELEPHONE ENCOUNTER
Pt called and left a voicemail stating she was in on Friday and was supposed to have gotten samples of Synthroid, but were given samples of Jardiance instead. She wants to know if she can get Synthroid samples.    Please advise.    Thanks.

## 2022-11-28 ENCOUNTER — TELEPHONE (OUTPATIENT)
Dept: ENDOCRINOLOGY | Facility: CLINIC | Age: 72
End: 2022-11-28

## 2022-11-30 ENCOUNTER — HOSPITAL ENCOUNTER (OUTPATIENT)
Dept: GENERAL RADIOLOGY | Facility: HOSPITAL | Age: 72
Discharge: HOME | End: 2022-11-30
Admitting: INTERNAL MEDICINE

## 2022-11-30 ENCOUNTER — TRANSCRIBE ORDERS (OUTPATIENT)
Dept: ADMINISTRATIVE | Facility: HOSPITAL | Age: 72
End: 2022-11-30

## 2022-11-30 DIAGNOSIS — G57.02 PIRIFORMIS SYNDROME OF LEFT SIDE: Primary | ICD-10-CM

## 2022-11-30 DIAGNOSIS — G57.02 PIRIFORMIS SYNDROME OF LEFT SIDE: ICD-10-CM

## 2022-11-30 PROCEDURE — 72110 X-RAY EXAM L-2 SPINE 4/>VWS: CPT

## 2022-11-30 PROCEDURE — 73502 X-RAY EXAM HIP UNI 2-3 VIEWS: CPT

## 2022-12-05 ENCOUNTER — DOCUMENTATION (OUTPATIENT)
Dept: ENDOCRINOLOGY | Facility: CLINIC | Age: 72
End: 2022-12-05

## 2022-12-19 ENCOUNTER — TRANSCRIBE ORDERS (OUTPATIENT)
Dept: ADMINISTRATIVE | Facility: HOSPITAL | Age: 72
End: 2022-12-19

## 2022-12-19 DIAGNOSIS — M43.16 SPONDYLOLISTHESIS OF LUMBAR REGION: Primary | ICD-10-CM

## 2022-12-21 ENCOUNTER — HOSPITAL ENCOUNTER (OUTPATIENT)
Dept: MRI IMAGING | Facility: HOSPITAL | Age: 72
Discharge: HOME | End: 2022-12-21
Admitting: INTERNAL MEDICINE

## 2022-12-21 DIAGNOSIS — M43.16 SPONDYLOLISTHESIS OF LUMBAR REGION: ICD-10-CM

## 2022-12-21 PROCEDURE — 72148 MRI LUMBAR SPINE W/O DYE: CPT

## 2023-01-06 ENCOUNTER — TELEPHONE (OUTPATIENT)
Dept: ENDOCRINOLOGY | Facility: CLINIC | Age: 73
End: 2023-01-06
Payer: MEDICARE

## 2023-01-06 DIAGNOSIS — Z79.4 TYPE 2 DIABETES MELLITUS WITH HYPOGLYCEMIA WITHOUT COMA, WITH LONG-TERM CURRENT USE OF INSULIN: Primary | ICD-10-CM

## 2023-01-06 DIAGNOSIS — E11.649 TYPE 2 DIABETES MELLITUS WITH HYPOGLYCEMIA WITHOUT COMA, WITH LONG-TERM CURRENT USE OF INSULIN: Primary | ICD-10-CM

## 2023-01-06 NOTE — TELEPHONE ENCOUNTER
Patient called and asked if we would refill her Turlicity. She would like the prescription faxed to:  [phone]  She is also requesting a call back for verification the prescription was sent in.    Phone: [phone]      Thank you.

## 2023-01-06 NOTE — TELEPHONE ENCOUNTER
Called to verify where the fax was being sent and that we cannot fax a script without knowing what the pharmacy is called

## 2023-01-11 ENCOUNTER — TELEPHONE (OUTPATIENT)
Dept: ENDOCRINOLOGY | Facility: CLINIC | Age: 73
End: 2023-01-11
Payer: MEDICARE

## 2023-01-11 DIAGNOSIS — E11.649 TYPE 2 DIABETES MELLITUS WITH HYPOGLYCEMIA WITHOUT COMA, WITH LONG-TERM CURRENT USE OF INSULIN: Primary | ICD-10-CM

## 2023-01-11 DIAGNOSIS — Z79.4 TYPE 2 DIABETES MELLITUS WITH HYPOGLYCEMIA WITHOUT COMA, WITH LONG-TERM CURRENT USE OF INSULIN: Primary | ICD-10-CM

## 2023-01-11 NOTE — TELEPHONE ENCOUNTER
Patient called requesting a prescription for Trulicity be sent to [facility] so they will refill it for her. She is asking for it to be faxed to [phone].    Thank you

## 2023-01-17 ENCOUNTER — TELEPHONE (OUTPATIENT)
Dept: ENDOCRINOLOGY | Facility: CLINIC | Age: 73
End: 2023-01-17
Payer: MEDICARE

## 2023-01-27 ENCOUNTER — LAB (OUTPATIENT)
Dept: LAB | Facility: HOSPITAL | Age: 73
End: 2023-01-27
Payer: MEDICARE

## 2023-01-27 DIAGNOSIS — E78.2 MIXED HYPERLIPIDEMIA: ICD-10-CM

## 2023-01-27 DIAGNOSIS — Z79.4 TYPE 2 DIABETES MELLITUS WITH HYPOGLYCEMIA WITHOUT COMA, WITH LONG-TERM CURRENT USE OF INSULIN: ICD-10-CM

## 2023-01-27 DIAGNOSIS — I10 ESSENTIAL HYPERTENSION: ICD-10-CM

## 2023-01-27 DIAGNOSIS — E03.9 ACQUIRED HYPOTHYROIDISM: ICD-10-CM

## 2023-01-27 DIAGNOSIS — E55.9 VITAMIN D DEFICIENCY, UNSPECIFIED: ICD-10-CM

## 2023-01-27 DIAGNOSIS — E11.649 TYPE 2 DIABETES MELLITUS WITH HYPOGLYCEMIA WITHOUT COMA, WITH LONG-TERM CURRENT USE OF INSULIN: ICD-10-CM

## 2023-01-27 LAB
ALBUMIN SERPL-MCNC: 4.4 G/DL (ref 3.5–5.2)
ALBUMIN/GLOB SERPL: 1.5 G/DL
ALP SERPL-CCNC: 48 U/L (ref 39–117)
ALT SERPL W P-5'-P-CCNC: 11 U/L (ref 1–33)
ANION GAP SERPL CALCULATED.3IONS-SCNC: 13 MMOL/L (ref 5–15)
AST SERPL-CCNC: 20 U/L (ref 1–32)
BASOPHILS # BLD AUTO: 0.05 10*3/MM3 (ref 0–0.2)
BASOPHILS NFR BLD AUTO: 0.7 % (ref 0–1.5)
BILIRUB SERPL-MCNC: 0.4 MG/DL (ref 0–1.2)
BUN SERPL-MCNC: 42 MG/DL (ref 8–23)
BUN/CREAT SERPL: 28.6 (ref 7–25)
CALCIUM SPEC-SCNC: 9.7 MG/DL (ref 8.6–10.5)
CHLORIDE SERPL-SCNC: 102 MMOL/L (ref 98–107)
CHOLEST SERPL-MCNC: 175 MG/DL (ref 0–200)
CO2 SERPL-SCNC: 24 MMOL/L (ref 22–29)
CREAT SERPL-MCNC: 1.47 MG/DL (ref 0.57–1)
DEPRECATED RDW RBC AUTO: 45.6 FL (ref 37–54)
EGFRCR SERPLBLD CKD-EPI 2021: 37.3 ML/MIN/1.73
EOSINOPHIL # BLD AUTO: 0.1 10*3/MM3 (ref 0–0.4)
EOSINOPHIL NFR BLD AUTO: 1.4 % (ref 0.3–6.2)
ERYTHROCYTE [DISTWIDTH] IN BLOOD BY AUTOMATED COUNT: 13.5 % (ref 12.3–15.4)
GLOBULIN UR ELPH-MCNC: 3 GM/DL
GLUCOSE SERPL-MCNC: 169 MG/DL (ref 65–99)
HBA1C MFR BLD: 6.9 % (ref 4.8–5.6)
HCT VFR BLD AUTO: 37.8 % (ref 34–46.6)
HDLC SERPL-MCNC: 70 MG/DL (ref 40–60)
HGB BLD-MCNC: 11.7 G/DL (ref 12–15.9)
IMM GRANULOCYTES # BLD AUTO: 0.02 10*3/MM3 (ref 0–0.05)
IMM GRANULOCYTES NFR BLD AUTO: 0.3 % (ref 0–0.5)
LDLC SERPL CALC-MCNC: 82 MG/DL (ref 0–100)
LDLC/HDLC SERPL: 1.12 {RATIO}
LYMPHOCYTES # BLD AUTO: 1.73 10*3/MM3 (ref 0.7–3.1)
LYMPHOCYTES NFR BLD AUTO: 23.4 % (ref 19.6–45.3)
MCH RBC QN AUTO: 28.4 PG (ref 26.6–33)
MCHC RBC AUTO-ENTMCNC: 31 G/DL (ref 31.5–35.7)
MCV RBC AUTO: 91.7 FL (ref 79–97)
MONOCYTES # BLD AUTO: 0.41 10*3/MM3 (ref 0.1–0.9)
MONOCYTES NFR BLD AUTO: 5.6 % (ref 5–12)
NEUTROPHILS NFR BLD AUTO: 5.07 10*3/MM3 (ref 1.7–7)
NEUTROPHILS NFR BLD AUTO: 68.6 % (ref 42.7–76)
NRBC BLD AUTO-RTO: 0 /100 WBC (ref 0–0.2)
PLATELET # BLD AUTO: 203 10*3/MM3 (ref 140–450)
PMV BLD AUTO: 11.4 FL (ref 6–12)
POTASSIUM SERPL-SCNC: 4.4 MMOL/L (ref 3.5–5.2)
PROT SERPL-MCNC: 7.4 G/DL (ref 6–8.5)
RBC # BLD AUTO: 4.12 10*6/MM3 (ref 3.77–5.28)
SODIUM SERPL-SCNC: 139 MMOL/L (ref 136–145)
TRIGL SERPL-MCNC: 133 MG/DL (ref 0–150)
TSH SERPL DL<=0.05 MIU/L-ACNC: 1.6 UIU/ML (ref 0.27–4.2)
VLDLC SERPL-MCNC: 23 MG/DL (ref 5–40)
WBC NRBC COR # BLD: 7.38 10*3/MM3 (ref 3.4–10.8)

## 2023-01-27 PROCEDURE — 80061 LIPID PANEL: CPT

## 2023-01-27 PROCEDURE — 82570 ASSAY OF URINE CREATININE: CPT

## 2023-01-27 PROCEDURE — 80053 COMPREHEN METABOLIC PANEL: CPT

## 2023-01-27 PROCEDURE — 82043 UR ALBUMIN QUANTITATIVE: CPT

## 2023-01-27 PROCEDURE — 84443 ASSAY THYROID STIM HORMONE: CPT

## 2023-01-27 PROCEDURE — 85025 COMPLETE CBC W/AUTO DIFF WBC: CPT

## 2023-01-27 PROCEDURE — 83036 HEMOGLOBIN GLYCOSYLATED A1C: CPT

## 2023-01-27 PROCEDURE — 36415 COLL VENOUS BLD VENIPUNCTURE: CPT

## 2023-01-27 PROCEDURE — 82306 VITAMIN D 25 HYDROXY: CPT

## 2023-01-28 LAB
25(OH)D3 SERPL-MCNC: 79.5 NG/ML (ref 30–100)
ALBUMIN UR-MCNC: 3.2 MG/DL
CREAT UR-MCNC: 34.8 MG/DL
MICROALBUMIN/CREAT UR: 92 MG/G

## 2023-02-01 ENCOUNTER — TELEPHONE (OUTPATIENT)
Dept: ENDOCRINOLOGY | Facility: CLINIC | Age: 73
End: 2023-02-01
Payer: MEDICARE

## 2023-02-01 DIAGNOSIS — Z79.4 TYPE 2 DIABETES MELLITUS WITH HYPOGLYCEMIA WITHOUT COMA, WITH LONG-TERM CURRENT USE OF INSULIN: Primary | ICD-10-CM

## 2023-02-01 DIAGNOSIS — E11.649 TYPE 2 DIABETES MELLITUS WITH HYPOGLYCEMIA WITHOUT COMA, WITH LONG-TERM CURRENT USE OF INSULIN: Primary | ICD-10-CM

## 2023-02-01 RX ORDER — LEVOTHYROXINE SODIUM 88 UG/1
88 TABLET ORAL DAILY
Qty: 30 TABLET | Refills: 3 | Status: SHIPPED | OUTPATIENT
Start: 2023-02-01 | End: 2023-02-01

## 2023-02-01 NOTE — TELEPHONE ENCOUNTER
Pt called to see if she could get Jardiance and Synthroid samples for herself and her sister, Conchis Knowles. They see [facility] together.     Please advise.    Thanks

## 2023-02-01 NOTE — TELEPHONE ENCOUNTER
Called to inform Kathryn that we did not have samples and that we would have to send a script she stated she did not need a script as she has a standing script with her mail delivery pharmacy and they had plenty

## 2023-02-01 NOTE — TELEPHONE ENCOUNTER
Called to let Kathryn know we would have to send a script and she stated she did not want one sent in

## 2023-02-08 ENCOUNTER — TRANSCRIBE ORDERS (OUTPATIENT)
Dept: ADMINISTRATIVE | Facility: HOSPITAL | Age: 73
End: 2023-02-08
Payer: MEDICARE

## 2023-02-08 DIAGNOSIS — Z12.31 SCREENING MAMMOGRAM FOR BREAST CANCER: Primary | ICD-10-CM

## 2023-02-09 ENCOUNTER — TELEPHONE (OUTPATIENT)
Dept: ENDOCRINOLOGY | Facility: CLINIC | Age: 73
End: 2023-02-09
Payer: MEDICARE

## 2023-02-09 NOTE — TELEPHONE ENCOUNTER
Patient is asking if we have samples of synthroid and jardiance. Requested call back to let her know.    Phone [phone]    Thank you

## 2023-02-10 NOTE — TELEPHONE ENCOUNTER
Called to let her know that we have samples of Jardiance    She stated that the synthroid was for her instead of her

## 2023-02-20 RX ORDER — LANCETS
EACH MISCELLANEOUS
Qty: 300 EACH | Refills: 3 | Status: SHIPPED | OUTPATIENT
Start: 2023-02-20

## 2023-03-08 ENCOUNTER — DOCUMENTATION (OUTPATIENT)
Dept: ENDOCRINOLOGY | Facility: CLINIC | Age: 73
End: 2023-03-08
Payer: MEDICARE

## 2023-03-08 ENCOUNTER — TELEPHONE (OUTPATIENT)
Dept: ENDOCRINOLOGY | Facility: CLINIC | Age: 73
End: 2023-03-08
Payer: MEDICARE

## 2023-03-08 NOTE — TELEPHONE ENCOUNTER
Patient called and states she has a new meter and needs new test strip for accu chek guide me. States [facility] should be calling about a prescription for them.

## 2023-03-10 ENCOUNTER — HOSPITAL ENCOUNTER (OUTPATIENT)
Dept: MAMMOGRAPHY | Facility: HOSPITAL | Age: 73
Discharge: HOME | End: 2023-03-10
Admitting: INTERNAL MEDICINE
Payer: MEDICARE

## 2023-03-10 PROCEDURE — 77067 SCR MAMMO BI INCL CAD: CPT

## 2023-03-10 PROCEDURE — 77063 BREAST TOMOSYNTHESIS BI: CPT

## 2023-03-21 ENCOUNTER — TELEPHONE (OUTPATIENT)
Dept: ENDOCRINOLOGY | Facility: CLINIC | Age: 73
End: 2023-03-21
Payer: MEDICARE

## 2023-03-21 DIAGNOSIS — Z79.4 TYPE 2 DIABETES MELLITUS WITH HYPOGLYCEMIA WITHOUT COMA, WITH LONG-TERM CURRENT USE OF INSULIN: Primary | ICD-10-CM

## 2023-03-21 DIAGNOSIS — E11.649 TYPE 2 DIABETES MELLITUS WITH HYPOGLYCEMIA WITHOUT COMA, WITH LONG-TERM CURRENT USE OF INSULIN: Primary | ICD-10-CM

## 2023-03-21 NOTE — TELEPHONE ENCOUNTER
Patient called and stated she needs a prescription for the Accu Chek Guide Me test Strips to be sent to [facility]. She has not received any since getting her new meter.    Phone [phone]    Thank you

## 2023-03-30 ENCOUNTER — DOCUMENTATION (OUTPATIENT)
Dept: ENDOCRINOLOGY | Facility: CLINIC | Age: 73
End: 2023-03-30
Payer: MEDICARE

## 2023-03-31 ENCOUNTER — TELEPHONE (OUTPATIENT)
Dept: ENDOCRINOLOGY | Facility: CLINIC | Age: 73
End: 2023-03-31
Payer: MEDICARE

## 2023-03-31 DIAGNOSIS — Z79.4 TYPE 2 DIABETES MELLITUS WITH HYPOGLYCEMIA WITHOUT COMA, WITH LONG-TERM CURRENT USE OF INSULIN: ICD-10-CM

## 2023-03-31 DIAGNOSIS — E11.649 TYPE 2 DIABETES MELLITUS WITH HYPOGLYCEMIA WITHOUT COMA, WITH LONG-TERM CURRENT USE OF INSULIN: ICD-10-CM

## 2023-03-31 NOTE — TELEPHONE ENCOUNTER
Called to let Kathryn know that she will have to reapply per [facility]. But as of this time [facility] is not taking any new patients Trulicity

## 2023-03-31 NOTE — TELEPHONE ENCOUNTER
Pt called requesting her RX for Trulicity to be faxed to [facility] at [phone].    Thanks

## 2023-04-06 ENCOUNTER — DOCUMENTATION (OUTPATIENT)
Dept: ENDOCRINOLOGY | Facility: CLINIC | Age: 73
End: 2023-04-06
Payer: MEDICARE

## 2023-04-06 NOTE — PROGRESS NOTES
Rx for Accu-check Guide Kit is faxed to [facility].#[phone]. Confirmation received and sent to

## 2023-05-04 ENCOUNTER — TELEPHONE (OUTPATIENT)
Dept: ENDOCRINOLOGY | Facility: CLINIC | Age: 73
End: 2023-05-04
Payer: MEDICARE

## 2023-05-04 DIAGNOSIS — E11.649 TYPE 2 DIABETES MELLITUS WITH HYPOGLYCEMIA WITHOUT COMA, WITH LONG-TERM CURRENT USE OF INSULIN: Primary | ICD-10-CM

## 2023-05-04 DIAGNOSIS — E55.9 VITAMIN D DEFICIENCY, UNSPECIFIED: ICD-10-CM

## 2023-05-04 DIAGNOSIS — E78.2 MIXED HYPERLIPIDEMIA: ICD-10-CM

## 2023-05-04 DIAGNOSIS — Z79.4 TYPE 2 DIABETES MELLITUS WITH HYPOGLYCEMIA WITHOUT COMA, WITH LONG-TERM CURRENT USE OF INSULIN: Primary | ICD-10-CM

## 2023-05-04 DIAGNOSIS — I10 ESSENTIAL HYPERTENSION: ICD-10-CM

## 2023-05-04 DIAGNOSIS — E03.9 ACQUIRED HYPOTHYROIDISM: ICD-10-CM

## 2023-05-04 NOTE — TELEPHONE ENCOUNTER
Pt. Called stating that new chart notes need to be faxed to [facility] for the Pt. To receive her Dexacom.     Fax Number: [phone]      The pt. Also asked if she need to get new blood work done before her next appointment on 05/18/2023. If so please call the Pt. Back to let her know.       You can call back at [phone]

## 2023-05-05 NOTE — TELEPHONE ENCOUNTER
Called and let Kathryn know that we cannot fax the chart notes unless we have received a request from them and let her know that there are lab orders entered for her to complete the week before her appointment

## 2023-05-10 ENCOUNTER — LAB (OUTPATIENT)
Dept: LAB | Facility: HOSPITAL | Age: 73
End: 2023-05-10
Payer: MEDICARE

## 2023-05-10 LAB
ALBUMIN SERPL-MCNC: 4.2 G/DL (ref 3.5–5.2)
ALBUMIN/GLOB SERPL: 1.6 G/DL
ALP SERPL-CCNC: 78 U/L (ref 39–117)
ALT SERPL W P-5'-P-CCNC: 14 U/L (ref 1–33)
ANION GAP SERPL CALCULATED.3IONS-SCNC: 8 MMOL/L (ref 5–15)
AST SERPL-CCNC: 18 U/L (ref 1–32)
BASOPHILS # BLD AUTO: 0.03 10*3/MM3 (ref 0–0.2)
BASOPHILS NFR BLD AUTO: 0.4 % (ref 0–1.5)
BILIRUB SERPL-MCNC: 0.4 MG/DL (ref 0–1.2)
BUN SERPL-MCNC: 30 MG/DL (ref 8–23)
BUN/CREAT SERPL: 22.9 (ref 7–25)
CALCIUM SPEC-SCNC: 9.3 MG/DL (ref 8.6–10.5)
CHLORIDE SERPL-SCNC: 105 MMOL/L (ref 98–107)
CHOLEST SERPL-MCNC: 135 MG/DL (ref 0–200)
CO2 SERPL-SCNC: 29 MMOL/L (ref 22–29)
CREAT SERPL-MCNC: 1.31 MG/DL (ref 0.57–1)
DEPRECATED RDW RBC AUTO: 44.6 FL (ref 37–54)
EGFRCR SERPLBLD CKD-EPI 2021: 42.8 ML/MIN/1.73
EOSINOPHIL # BLD AUTO: 0.32 10*3/MM3 (ref 0–0.4)
EOSINOPHIL NFR BLD AUTO: 4.6 % (ref 0.3–6.2)
ERYTHROCYTE [DISTWIDTH] IN BLOOD BY AUTOMATED COUNT: 12.8 % (ref 12.3–15.4)
GLOBULIN UR ELPH-MCNC: 2.6 GM/DL
GLUCOSE SERPL-MCNC: 89 MG/DL (ref 65–99)
HBA1C MFR BLD: 7 % (ref 4.8–5.6)
HCT VFR BLD AUTO: 38.4 % (ref 34–46.6)
HDLC SERPL-MCNC: 49 MG/DL (ref 40–60)
HGB BLD-MCNC: 12 G/DL (ref 12–15.9)
IMM GRANULOCYTES # BLD AUTO: 0.02 10*3/MM3 (ref 0–0.05)
IMM GRANULOCYTES NFR BLD AUTO: 0.3 % (ref 0–0.5)
LDLC SERPL CALC-MCNC: 51 MG/DL (ref 0–100)
LDLC/HDLC SERPL: 0.87 {RATIO}
LYMPHOCYTES # BLD AUTO: 2.42 10*3/MM3 (ref 0.7–3.1)
LYMPHOCYTES NFR BLD AUTO: 35 % (ref 19.6–45.3)
MCH RBC QN AUTO: 29.5 PG (ref 26.6–33)
MCHC RBC AUTO-ENTMCNC: 31.3 G/DL (ref 31.5–35.7)
MCV RBC AUTO: 94.3 FL (ref 79–97)
MONOCYTES # BLD AUTO: 0.43 10*3/MM3 (ref 0.1–0.9)
MONOCYTES NFR BLD AUTO: 6.2 % (ref 5–12)
NEUTROPHILS NFR BLD AUTO: 3.7 10*3/MM3 (ref 1.7–7)
NEUTROPHILS NFR BLD AUTO: 53.5 % (ref 42.7–76)
NRBC BLD AUTO-RTO: 0 /100 WBC (ref 0–0.2)
PLATELET # BLD AUTO: 170 10*3/MM3 (ref 140–450)
PMV BLD AUTO: 11.3 FL (ref 6–12)
POTASSIUM SERPL-SCNC: 4.9 MMOL/L (ref 3.5–5.2)
PROT SERPL-MCNC: 6.8 G/DL (ref 6–8.5)
RBC # BLD AUTO: 4.07 10*6/MM3 (ref 3.77–5.28)
SODIUM SERPL-SCNC: 142 MMOL/L (ref 136–145)
TRIGL SERPL-MCNC: 218 MG/DL (ref 0–150)
TSH SERPL DL<=0.05 MIU/L-ACNC: 1.86 UIU/ML (ref 0.27–4.2)
VLDLC SERPL-MCNC: 35 MG/DL (ref 5–40)
WBC NRBC COR # BLD: 6.92 10*3/MM3 (ref 3.4–10.8)

## 2023-05-10 PROCEDURE — 82043 UR ALBUMIN QUANTITATIVE: CPT | Performed by: NURSE PRACTITIONER

## 2023-05-10 PROCEDURE — 85025 COMPLETE CBC W/AUTO DIFF WBC: CPT | Performed by: NURSE PRACTITIONER

## 2023-05-10 PROCEDURE — 80053 COMPREHEN METABOLIC PANEL: CPT | Performed by: NURSE PRACTITIONER

## 2023-05-10 PROCEDURE — 80061 LIPID PANEL: CPT | Performed by: NURSE PRACTITIONER

## 2023-05-10 PROCEDURE — 82570 ASSAY OF URINE CREATININE: CPT | Performed by: NURSE PRACTITIONER

## 2023-05-10 PROCEDURE — 84443 ASSAY THYROID STIM HORMONE: CPT | Performed by: NURSE PRACTITIONER

## 2023-05-10 PROCEDURE — 83036 HEMOGLOBIN GLYCOSYLATED A1C: CPT | Performed by: NURSE PRACTITIONER

## 2023-05-10 PROCEDURE — 82607 VITAMIN B-12: CPT | Performed by: NURSE PRACTITIONER

## 2023-05-10 PROCEDURE — 82306 VITAMIN D 25 HYDROXY: CPT | Performed by: NURSE PRACTITIONER

## 2023-05-11 LAB
25(OH)D3 SERPL-MCNC: 70.9 NG/ML (ref 30–100)
ALBUMIN UR-MCNC: <1.2 MG/DL
CREAT UR-MCNC: 66.6 MG/DL
MICROALBUMIN/CREAT UR: NORMAL MG/G{CREAT}
VIT B12 BLD-MCNC: 363 PG/ML (ref 211–946)

## 2023-05-18 ENCOUNTER — OFFICE VISIT (OUTPATIENT)
Dept: ENDOCRINOLOGY | Facility: CLINIC | Age: 73
End: 2023-05-18
Payer: MEDICARE

## 2023-05-18 VITALS
WEIGHT: 158.8 LBS | HEIGHT: 61 IN | SYSTOLIC BLOOD PRESSURE: 136 MMHG | HEART RATE: 79 BPM | OXYGEN SATURATION: 96 % | BODY MASS INDEX: 29.98 KG/M2 | DIASTOLIC BLOOD PRESSURE: 78 MMHG

## 2023-05-18 DIAGNOSIS — I10 ESSENTIAL HYPERTENSION: ICD-10-CM

## 2023-05-18 DIAGNOSIS — E78.2 MIXED HYPERLIPIDEMIA: ICD-10-CM

## 2023-05-18 DIAGNOSIS — E03.9 ACQUIRED HYPOTHYROIDISM: ICD-10-CM

## 2023-05-18 DIAGNOSIS — Z79.4 TYPE 2 DIABETES MELLITUS WITH HYPOGLYCEMIA WITHOUT COMA, WITH LONG-TERM CURRENT USE OF INSULIN: Primary | ICD-10-CM

## 2023-05-18 DIAGNOSIS — E11.649 TYPE 2 DIABETES MELLITUS WITH HYPOGLYCEMIA WITHOUT COMA, WITH LONG-TERM CURRENT USE OF INSULIN: Primary | ICD-10-CM

## 2023-05-18 RX ORDER — ROSUVASTATIN CALCIUM 20 MG/1
20 TABLET, COATED ORAL DAILY
COMMUNITY
Start: 2023-04-27

## 2023-05-18 RX ORDER — DAPAGLIFLOZIN 10 MG/1
10 TABLET, FILM COATED ORAL DAILY
COMMUNITY

## 2023-05-18 NOTE — PROGRESS NOTES
"Chief Complaint  Diabetes    Subjective          Kathryn Wray presents to [facility] ENDOCRINOLOGY  History of Present Illness       In office visit        Primary care provider Dr. Tom    72 year old female presents for follow up    Diabetes mellitus type 2     Diagnosed at 38     Timing constant     Quality improved    Severity moderate        complications - neuropathy , renal disease           Current diabetes regimen      Oral medications, insulin , GLP-1     Current glucose monitoring            Checking 4 times daily and has been for over 90 days         Has the DEXCOM G6     See below       Review of Systems - General ROS: negative               Objective   Vital Signs:   /78   Pulse 79   Ht 154.9 cm (61\")   Wt 72 kg (158 lb 12.8 oz)   SpO2 96%   BMI 30.00 kg/m²     Physical Exam  Constitutional:       Appearance: Normal appearance.   Cardiovascular:      Rate and Rhythm: Regular rhythm.   Musculoskeletal:         General: Normal range of motion.      Cervical back: Normal range of motion and neck supple.   Neurological:      Mental Status: She is alert.        Result Review :   The following data was reviewed by: BARB Trotter on 05/18/2023:  Common labs        9/27/2022    16:27 1/27/2023    15:47 5/10/2023    15:23   Common Labs   Glucose 99   169   89     BUN 38   42   30     Creatinine 1.41   1.47   1.31     Sodium 142   139   142     Potassium 4.0   4.4   4.9     Chloride 104   102   105     Calcium 9.8   9.7   9.3     Albumin 4.70   4.4   4.2     Total Bilirubin 0.4   0.4   0.4     Alkaline Phosphatase 42   48   78     AST (SGOT) 29   20   18     ALT (SGPT) 18   11   14     WBC 7.97   7.38   6.92     Hemoglobin 12.3   11.7   12.0     Hematocrit 38.4   37.8   38.4     Platelets 224   203   170     Total Cholesterol 162   175   135     Triglycerides 58   133   218     HDL Cholesterol 75   70   49     LDL Cholesterol  75   82   51   "   Hemoglobin A1C 6.90   6.90   7.00     Microalbumin, Urine 2.6   3.2   <1.2                 Assessment and Plan    Diagnoses and all orders for this visit:    1. Type 2 diabetes mellitus with hypoglycemia without coma, with long-term current use of insulin (Primary)  -     CBC & Differential; Future  -     Comprehensive Metabolic Panel; Future  -     Hemoglobin A1c; Future  -     Lipid Panel; Future  -     Microalbumin / Creatinine Urine Ratio - Urine, Clean Catch; Future  -     TSH; Future  -     Vitamin B12; Future    2. Essential hypertension  -     CBC & Differential; Future  -     Comprehensive Metabolic Panel; Future  -     Hemoglobin A1c; Future  -     Lipid Panel; Future  -     Microalbumin / Creatinine Urine Ratio - Urine, Clean Catch; Future  -     TSH; Future  -     Vitamin B12; Future    3. Acquired hypothyroidism  -     CBC & Differential; Future  -     Comprehensive Metabolic Panel; Future  -     Hemoglobin A1c; Future  -     Lipid Panel; Future  -     Microalbumin / Creatinine Urine Ratio - Urine, Clean Catch; Future  -     TSH; Future  -     Vitamin B12; Future    4. Mixed hyperlipidemia  -     CBC & Differential; Future  -     Comprehensive Metabolic Panel; Future  -     Hemoglobin A1c; Future  -     Lipid Panel; Future  -     Microalbumin / Creatinine Urine Ratio - Urine, Clean Catch; Future  -     TSH; Future  -     Vitamin B12; Future           Glycemic Management:        Diabetes mellitus type 2         Lab Results   Component Value Date    HGBA1C 7.00 (H) 05/10/2023          Dexcom downloaded and reviewed            Ambulatory Glucose Profile Report    Days Analyzed : 2 week period ending on 05/18/23      Continuous Glucose Monitory Device:  Dexcom G6     - 2% very high target range  - 12% high target range  - 82% in target range  - 3% below target range  - GMI 6.4 %  - Average glucose 131 mg/dl    Interpretation : Diabetes Type 2 Controlled           Lows over night --decrease basal insulin                      taking farxiga 10 mg once daily        Taking  Lantus 25 units -- decrease to 22 units            Taking Trulicity 1.5 mg weekly      Side effects can include nausea, if nausea occurs while you are eating stop eating the medicine is telling you you are full     If you have vomiting and abdominal pain stop medication and call the office           Novolog injecting TID before each meal      12 units TID before  Meal   +      Sliding scale Humalog     1 unit for every 50 above 150        Uses dexcom data to help adjust insulin dosage             glipizide 10 mg one daily --stopped due to hypoglycemia               Goals for sugar     Fasting and before meals 80 to 130     2 hours after meals 180 or less        Aim for 45 grams of carbohydrate per meal     Aim for 15 grams of carbohydrate per snack               Called in GVOKE      Dexcom has allowed the patient to minimize hypoglycemia as alarms have predicted and avoided them     She also uses the arrows on the dexcom as follows:     If arrow is horizontal , she uses the predicted dosage      If the arrow is slanted up or down-- she increase or decrease by 2 units     If the arrow is vertical up or down - she increases or decreases by 4  unit      Microvascular Complications Monitoring         Last eye exam--Dec. 2021 , no DR      Neuropathy -yes         Taking  gabapentin 300 mg          Lipid Management:         Taking tricor 145 mg daily     Taking rosuvastatin 20 mg one at night               Blood Pressure Management:        Taking labetalol 300 mg      Taking losartan 100 mg daily      Taking Norvasc 5 mg daily                  Thyroid Health     Hypothyroidism      Taking levothyroxine 88 mcg daily                           Lab Results   Component Value Date     TSH 2.610 09/27/2022               Bone Health         Taking vitamin d supplement                   Preventive Care:       Non smoker                              Follow Up    Return in about 3 months (around 8/18/2023) for Recheck, labs one week prior to next appointment.  Patient was given instructions and counseling regarding her condition or for health maintenance advice. Please see specific information pulled into the AVS if appropriate.         This document has been electronically signed by BARB Trotter on May 18, 2023 14:52 CDT.

## 2023-05-31 ENCOUNTER — DOCUMENTATION (OUTPATIENT)
Dept: ENDOCRINOLOGY | Facility: CLINIC | Age: 73
End: 2023-05-31

## 2023-05-31 NOTE — PROGRESS NOTES
PWO FOR DEXCOM SUPPLIES FAXED WITH CHART NOTES TO [facility] @ [phone]

## 2023-06-15 ENCOUNTER — TELEPHONE (OUTPATIENT)
Dept: GASTROENTEROLOGY | Facility: CLINIC | Age: 73
End: 2023-06-15
Payer: MEDICARE

## 2023-08-16 DIAGNOSIS — E11.649 TYPE 2 DIABETES MELLITUS WITH HYPOGLYCEMIA WITHOUT COMA, WITH LONG-TERM CURRENT USE OF INSULIN: ICD-10-CM

## 2023-08-16 DIAGNOSIS — Z79.4 TYPE 2 DIABETES MELLITUS WITH HYPOGLYCEMIA WITHOUT COMA, WITH LONG-TERM CURRENT USE OF INSULIN: ICD-10-CM

## 2023-08-16 RX ORDER — BLOOD SUGAR DIAGNOSTIC
STRIP MISCELLANEOUS
Qty: 200 EACH | Refills: 12 | Status: SHIPPED | OUTPATIENT
Start: 2023-08-16

## 2023-08-29 ENCOUNTER — OFFICE VISIT (OUTPATIENT)
Dept: ENDOCRINOLOGY | Facility: CLINIC | Age: 73
End: 2023-08-29
Payer: MEDICARE

## 2023-08-29 ENCOUNTER — LAB (OUTPATIENT)
Dept: LAB | Facility: HOSPITAL | Age: 73
End: 2023-08-29
Payer: MEDICARE

## 2023-08-29 VITALS
HEART RATE: 68 BPM | WEIGHT: 150.6 LBS | HEIGHT: 61 IN | DIASTOLIC BLOOD PRESSURE: 68 MMHG | OXYGEN SATURATION: 99 % | SYSTOLIC BLOOD PRESSURE: 130 MMHG | BODY MASS INDEX: 28.43 KG/M2

## 2023-08-29 DIAGNOSIS — E78.2 MIXED HYPERLIPIDEMIA: ICD-10-CM

## 2023-08-29 DIAGNOSIS — I10 ESSENTIAL HYPERTENSION: ICD-10-CM

## 2023-08-29 DIAGNOSIS — Z79.4 TYPE 2 DIABETES MELLITUS WITH HYPOGLYCEMIA WITHOUT COMA, WITH LONG-TERM CURRENT USE OF INSULIN: ICD-10-CM

## 2023-08-29 DIAGNOSIS — E11.65 TYPE 2 DIABETES MELLITUS WITH HYPERGLYCEMIA, WITH LONG-TERM CURRENT USE OF INSULIN: ICD-10-CM

## 2023-08-29 DIAGNOSIS — Z79.4 TYPE 2 DIABETES MELLITUS WITH HYPERGLYCEMIA, WITH LONG-TERM CURRENT USE OF INSULIN: ICD-10-CM

## 2023-08-29 DIAGNOSIS — E11.649 TYPE 2 DIABETES MELLITUS WITH HYPOGLYCEMIA WITHOUT COMA, WITH LONG-TERM CURRENT USE OF INSULIN: ICD-10-CM

## 2023-08-29 DIAGNOSIS — E03.9 ACQUIRED HYPOTHYROIDISM: ICD-10-CM

## 2023-08-29 DIAGNOSIS — E55.9 VITAMIN D DEFICIENCY, UNSPECIFIED: ICD-10-CM

## 2023-08-29 DIAGNOSIS — E03.9 ACQUIRED HYPOTHYROIDISM: Primary | ICD-10-CM

## 2023-08-29 LAB
ALBUMIN SERPL-MCNC: 3.8 G/DL (ref 3.5–5.2)
ALBUMIN UR-MCNC: 1.7 MG/DL
ALBUMIN/GLOB SERPL: 1.1 G/DL
ALP SERPL-CCNC: 79 U/L (ref 39–117)
ALT SERPL W P-5'-P-CCNC: 20 U/L (ref 1–33)
ANION GAP SERPL CALCULATED.3IONS-SCNC: 7 MMOL/L (ref 5–15)
AST SERPL-CCNC: 21 U/L (ref 1–32)
BASOPHILS # BLD AUTO: 0.05 10*3/MM3 (ref 0–0.2)
BASOPHILS NFR BLD AUTO: 0.5 % (ref 0–1.5)
BILIRUB SERPL-MCNC: 0.5 MG/DL (ref 0–1.2)
BUN SERPL-MCNC: 31 MG/DL (ref 8–23)
BUN/CREAT SERPL: 23 (ref 7–25)
CALCIUM SPEC-SCNC: 9.2 MG/DL (ref 8.6–10.5)
CHLORIDE SERPL-SCNC: 105 MMOL/L (ref 98–107)
CHOLEST SERPL-MCNC: 113 MG/DL (ref 0–200)
CO2 SERPL-SCNC: 28 MMOL/L (ref 22–29)
CREAT SERPL-MCNC: 1.35 MG/DL (ref 0.57–1)
CREAT UR-MCNC: 61.8 MG/DL
DEPRECATED RDW RBC AUTO: 44.1 FL (ref 37–54)
EGFRCR SERPLBLD CKD-EPI 2021: 41.1 ML/MIN/1.73
EOSINOPHIL # BLD AUTO: 0.43 10*3/MM3 (ref 0–0.4)
EOSINOPHIL NFR BLD AUTO: 4.6 % (ref 0.3–6.2)
ERYTHROCYTE [DISTWIDTH] IN BLOOD BY AUTOMATED COUNT: 13.3 % (ref 12.3–15.4)
GLOBULIN UR ELPH-MCNC: 3.5 GM/DL
GLUCOSE SERPL-MCNC: 143 MG/DL (ref 65–99)
HBA1C MFR BLD: 6.9 % (ref 4.8–5.6)
HCT VFR BLD AUTO: 37.6 % (ref 34–46.6)
HDLC SERPL-MCNC: 48 MG/DL (ref 40–60)
HGB BLD-MCNC: 12.4 G/DL (ref 12–15.9)
IMM GRANULOCYTES # BLD AUTO: 0.02 10*3/MM3 (ref 0–0.05)
IMM GRANULOCYTES NFR BLD AUTO: 0.2 % (ref 0–0.5)
LDLC SERPL CALC-MCNC: 45 MG/DL (ref 0–100)
LDLC/HDLC SERPL: 0.89 {RATIO}
LYMPHOCYTES # BLD AUTO: 2.54 10*3/MM3 (ref 0.7–3.1)
LYMPHOCYTES NFR BLD AUTO: 27.1 % (ref 19.6–45.3)
MCH RBC QN AUTO: 29.8 PG (ref 26.6–33)
MCHC RBC AUTO-ENTMCNC: 33 G/DL (ref 31.5–35.7)
MCV RBC AUTO: 90.4 FL (ref 79–97)
MICROALBUMIN/CREAT UR: 27.5 MG/G
MONOCYTES # BLD AUTO: 0.64 10*3/MM3 (ref 0.1–0.9)
MONOCYTES NFR BLD AUTO: 6.8 % (ref 5–12)
NEUTROPHILS NFR BLD AUTO: 5.68 10*3/MM3 (ref 1.7–7)
NEUTROPHILS NFR BLD AUTO: 60.8 % (ref 42.7–76)
NRBC BLD AUTO-RTO: 0 /100 WBC (ref 0–0.2)
PLATELET # BLD AUTO: 174 10*3/MM3 (ref 140–450)
PMV BLD AUTO: 11.7 FL (ref 6–12)
POTASSIUM SERPL-SCNC: 4.7 MMOL/L (ref 3.5–5.2)
PROT SERPL-MCNC: 7.3 G/DL (ref 6–8.5)
RBC # BLD AUTO: 4.16 10*6/MM3 (ref 3.77–5.28)
SODIUM SERPL-SCNC: 140 MMOL/L (ref 136–145)
TRIGL SERPL-MCNC: 111 MG/DL (ref 0–150)
TSH SERPL DL<=0.05 MIU/L-ACNC: 1.67 UIU/ML (ref 0.27–4.2)
VIT B12 BLD-MCNC: 374 PG/ML (ref 211–946)
VLDLC SERPL-MCNC: 20 MG/DL (ref 5–40)
WBC NRBC COR # BLD: 9.36 10*3/MM3 (ref 3.4–10.8)

## 2023-08-29 PROCEDURE — 80061 LIPID PANEL: CPT

## 2023-08-29 PROCEDURE — 82043 UR ALBUMIN QUANTITATIVE: CPT

## 2023-08-29 PROCEDURE — 80053 COMPREHEN METABOLIC PANEL: CPT

## 2023-08-29 PROCEDURE — 82570 ASSAY OF URINE CREATININE: CPT

## 2023-08-29 PROCEDURE — 82607 VITAMIN B-12: CPT

## 2023-08-29 PROCEDURE — 84443 ASSAY THYROID STIM HORMONE: CPT

## 2023-08-29 PROCEDURE — 36415 COLL VENOUS BLD VENIPUNCTURE: CPT

## 2023-08-29 PROCEDURE — 85025 COMPLETE CBC W/AUTO DIFF WBC: CPT

## 2023-08-29 PROCEDURE — 83036 HEMOGLOBIN GLYCOSYLATED A1C: CPT

## 2023-08-29 NOTE — PROGRESS NOTES
"Chief Complaint  Diabetes (dexcom)    Subjective          Kathryn Wray presents to [facility]  History of Present Illness       In office visit        Primary care provider Dr. Tom    72 year old female presents for follow up    Diabetes mellitus type 2     Diagnosed at 38     Timing constant     Quality improved    Severity moderate        complications - neuropathy , renal disease           Current diabetes regimen      Oral medications, insulin , GLP-1     Current glucose monitoring            Checking 4 times daily and has been for over 90 days         Has the DEXCOM G6     See below       Review of Systems - General ROS: negative               Objective   Vital Signs:   /68   Pulse 68   Ht 154.9 cm (61\")   Wt 68.3 kg (150 lb 9.6 oz)   SpO2 99%   BMI 28.46 kg/mý     Physical Exam  Constitutional:       Appearance: Normal appearance.   Cardiovascular:      Rate and Rhythm: Regular rhythm.   Musculoskeletal:         General: Normal range of motion.      Cervical back: Normal range of motion and neck supple.   Neurological:      Mental Status: She is alert.      Result Review :   The following data was reviewed by: BARB Trotter on 05/18/2023:  Common labs          1/27/2023    15:47 5/10/2023    15:23 8/29/2023    13:08   Common Labs   Glucose 169  89  143    BUN 42  30  31    Creatinine 1.47  1.31  1.35    Sodium 139  142  140    Potassium 4.4  4.9  4.7    Chloride 102  105  105    Calcium 9.7  9.3  9.2    Albumin 4.4  4.2  3.8    Total Bilirubin 0.4  0.4  0.5    Alkaline Phosphatase 48  78  79    AST (SGOT) 20  18  21    ALT (SGPT) 11  14  20    WBC 7.38  6.92  9.36    Hemoglobin 11.7  12.0  12.4    Hematocrit 37.8  38.4  37.6    Platelets 203  170  174    Total Cholesterol 175  135     Triglycerides 133  218     HDL Cholesterol 70  49     LDL Cholesterol  82  51     Hemoglobin A1C 6.90  7.00     Microalbumin, Urine 3.2  <1.2            "      Assessment and Plan    Diagnoses and all orders for this visit:    1. Acquired hypothyroidism (Primary)  -     CBC & Differential; Future  -     Comprehensive Metabolic Panel; Future  -     Hemoglobin A1c; Future  -     Lipid Panel; Future  -     Microalbumin / Creatinine Urine Ratio - Urine, Clean Catch; Future  -     TSH; Future  -     Vitamin B12; Future  -     Vitamin D,25-Hydroxy; Future    2. Essential hypertension  -     CBC & Differential; Future  -     Comprehensive Metabolic Panel; Future  -     Hemoglobin A1c; Future  -     Lipid Panel; Future  -     Microalbumin / Creatinine Urine Ratio - Urine, Clean Catch; Future  -     TSH; Future  -     Vitamin B12; Future  -     Vitamin D,25-Hydroxy; Future    3. Mixed hyperlipidemia  -     CBC & Differential; Future  -     Comprehensive Metabolic Panel; Future  -     Hemoglobin A1c; Future  -     Lipid Panel; Future  -     Microalbumin / Creatinine Urine Ratio - Urine, Clean Catch; Future  -     TSH; Future  -     Vitamin B12; Future  -     Vitamin D,25-Hydroxy; Future    4. Type 2 diabetes mellitus with hyperglycemia, with long-term current use of insulin  -     CBC & Differential; Future  -     Comprehensive Metabolic Panel; Future  -     Hemoglobin A1c; Future  -     Lipid Panel; Future  -     Microalbumin / Creatinine Urine Ratio - Urine, Clean Catch; Future  -     TSH; Future  -     Vitamin B12; Future  -     Vitamin D,25-Hydroxy; Future    5. Vitamin D deficiency, unspecified  -     Vitamin D,25-Hydroxy; Future           Glycemic Management:        Diabetes mellitus type 2         Lab Results   Component Value Date    HGBA1C 7.00 (H) 05/10/2023          Dexcom downloaded and reviewed             Ambulatory Glucose Profile Report    Days Analyzed : 2 week period ending on 08/29/23      Continuous Glucose Monitory Device:  Dexcom G6     10% very high target range  30% high target range  59% in target range  1% below target range  GMI 7.4 %  Average glucose  168 mg/dl    Interpretation : Diabetes Type 2 Uncontrolled          steroid injection        Running high with meals     Adjust mealtime                  taking farxiga 10 mg once daily        Taking  Lantus  22 units            Trulicity 1.5 mg weekly ---- no longer      Side effects can include nausea, if nausea occurs while you are eating stop eating the medicine is telling you you are full     If you have vomiting and abdominal pain stop medication and call the office           Novolog injecting TID before each meal      12 units TID before  Meal -- increase to 16 units for meals   +      Sliding scale Humalg     1 unit for every 50 above 150        Uses dexcom data to help adjust insulin dosage             glipizide 10 mg one daily --stopped due to hypoglycemia               Goals for sugar     Fasting and before meals 80 to 130     2 hours after meals 180 or less        Aim for 45 grams of carbohydrate per meal     Aim for 15 grams of carbohydrate per snack               Called in GVOKE      Dexcom has allowed the patient to minimize hypoglycemia as alarms have predicted and avoided them     She also uses the arrows on the dexcom as follows:     If arrow is horizontal , she uses the predicted dosage      If the arrow is slanted up or down-- she increase or decrease by 2 units     If the arrow is vertical up or down - she increases or decreases by 4  unit      Microvascular Complications Monitoring         Last eye exam--Dec. 2021 , no DR      Neuropathy -yes         Taking  gabapentin 300 mg          Lipid Management:         Taking tricor 145 mg daily     Taking rosuvastatin 20 mg one at night               Blood Pressure Management:        Taking labetalol 300 mg      Taking losartan 100 mg daily      Taking Norvasc 5 mg daily                  Thyroid Health     Hypothyroidism      Taking levothyroxine 88 mcg daily                           Lab Results   Component Value Date     TSH 2.610 09/27/2022                Bone Health         Taking vitamin d supplement                   Preventive Care:       Non smoker                              Follow Up   Return in about 3 months (around 11/29/2023) for Recheck, labs one week prior to next appointment.  Patient was given instructions and counseling regarding her condition or for health maintenance advice. Please see specific information pulled into the AVS if appropriate.         This document has been electronically signed by BARB Trotter on August 29, 2023 14:33 CDT.

## 2023-08-30 ENCOUNTER — TELEPHONE (OUTPATIENT)
Dept: ENDOCRINOLOGY | Facility: CLINIC | Age: 73
End: 2023-08-30
Payer: MEDICARE

## 2023-08-30 NOTE — TELEPHONE ENCOUNTER
----- Message from BARB Trotter sent at 8/30/2023 11:32 AM CDT -----  A1c is 6.9% , urine neg for protein; thyroid normal

## 2024-02-15 ENCOUNTER — TRANSCRIBE ORDERS (OUTPATIENT)
Dept: ADMINISTRATIVE | Facility: HOSPITAL | Age: 74
End: 2024-02-15
Payer: MEDICARE

## 2024-02-15 DIAGNOSIS — Z12.31 ENCOUNTER FOR SCREENING MAMMOGRAM FOR MALIGNANT NEOPLASM OF BREAST: Primary | ICD-10-CM

## 2024-03-22 ENCOUNTER — HOSPITAL ENCOUNTER (OUTPATIENT)
Dept: MAMMOGRAPHY | Facility: HOSPITAL | Age: 74
Discharge: HOME | End: 2024-03-22
Admitting: INTERNAL MEDICINE
Payer: MEDICARE

## 2024-03-22 DIAGNOSIS — Z12.31 ENCOUNTER FOR SCREENING MAMMOGRAM FOR MALIGNANT NEOPLASM OF BREAST: ICD-10-CM

## 2024-03-22 PROCEDURE — 77067 SCR MAMMO BI INCL CAD: CPT

## 2024-03-22 PROCEDURE — 77063 BREAST TOMOSYNTHESIS BI: CPT

## 2024-04-25 NOTE — TELEPHONE ENCOUNTER
She needs to call her doctor that prescribed these medications,  I would suggest cutting back but I don't know why she is on these specific medications, this would be primary or if she has cardiologist    No

## 2024-11-04 NOTE — PROGRESS NOTES
Orthopaedic Clinic Note - Established Patient    NAME:  Radha Bains   : 1950  MRN: [id number]      2024      CHIEF COMPLAINT:  follow up right knee pain, repeat injection      HISTORY OF PRESENT ILLNESS:   The patient is a 74 y.o. female who returns today for follow up of right knee pain, requesting repeat injection. It has been 3 months since last injection. Patient denies any recent trauma, fall, or other other injury. Pain is rated 6/10 today.    Past Medical History:        Diagnosis Date    Diabetes mellitus (HCC)     Hyperlipidemia     Hypertension        Past Surgical History:        Procedure Laterality Date    CARPAL TUNNEL RELEASE Left 2016    CARPAL TUNNEL RELEASE AND EXCISION OF GANGLION CYST  performed by Federico Ruiz MD at [facility] OR    CHOLECYSTECTOMY, LAPAROSCOPIC  2016    HYSTERECTOMY (CERVIX STATUS UNKNOWN)         Current Medications:   Prior to Admission medications    Medication Sig Start Date End Date Taking? Authorizing Provider   dapagliflozin (FARXIGA) 10 MG tablet Take 1 tablet by mouth Every Day   Yes ProviderCarolee MD   vitamin D (ERGOCALCIFEROL) 1.25 MG (09254 UT) CAPS capsule Take 1 capsule by mouth daily   Yes ProviderCarolee MD   glimepiride (AMARYL) 2 MG tablet Take 1 tablet by mouth daily (with breakfast)   Yes Provider, MD Carolee   hydroCHLOROthiazide 12.5 MG tablet Take 1 tablet by mouth daily   Yes Carolee Brown MD   irbesartan (AVAPRO) 300 MG tablet Take 1 tablet by mouth nightly   Yes ProviderCarolee MD   meclizine (ANTIVERT) 25 MG tablet TAKE 1 TABLET BY MOUTH 3 TIMES A DAY AS NEEDED 10/12/24  Yes Carolee Brown MD   metoprolol succinate (TOPROL XL) 50 MG extended release tablet Take 1 tablet by mouth daily 23  Yes ProviderCarolee MD   ondansetron (ZOFRAN-ODT) 4 MG disintegrating tablet  10/14/24  Yes Carolee Brown MD   oxyBUTYnin (DITROPAN) 5 MG tablet  8/15/24  Yes Kevin

## 2024-11-05 ENCOUNTER — OFFICE VISIT (OUTPATIENT)
Age: 74
End: 2024-11-05
Payer: MEDICARE

## 2024-11-05 VITALS — BODY MASS INDEX: 28.71 KG/M2 | WEIGHT: 156 LBS | HEIGHT: 62 IN

## 2024-11-05 DIAGNOSIS — M17.11 PRIMARY OSTEOARTHRITIS OF RIGHT KNEE: Primary | ICD-10-CM

## 2024-11-05 PROCEDURE — 20610 DRAIN/INJ JOINT/BURSA W/O US: CPT

## 2024-11-05 RX ORDER — MECLIZINE HYDROCHLORIDE 25 MG/1
TABLET ORAL
COMMUNITY
Start: 2024-10-12

## 2024-11-05 RX ORDER — DAPAGLIFLOZIN 10 MG/1
1 TABLET, FILM COATED ORAL
COMMUNITY

## 2024-11-05 RX ORDER — OXYBUTYNIN CHLORIDE 5 MG/1
TABLET ORAL
COMMUNITY
Start: 2024-08-15

## 2024-11-05 RX ORDER — ROSUVASTATIN CALCIUM 20 MG/1
TABLET, COATED ORAL
COMMUNITY
Start: 2024-10-08

## 2024-11-05 RX ORDER — GLIMEPIRIDE 2 MG/1
2 TABLET ORAL
COMMUNITY

## 2024-11-05 RX ORDER — ERGOCALCIFEROL 1.25 MG/1
50000 CAPSULE, LIQUID FILLED ORAL DAILY
COMMUNITY

## 2024-11-05 RX ORDER — IRBESARTAN 300 MG/1
300 TABLET ORAL NIGHTLY
COMMUNITY

## 2024-11-05 RX ORDER — TEMAZEPAM 30 MG/1
CAPSULE ORAL
COMMUNITY
Start: 2024-08-06

## 2024-11-05 RX ORDER — LIDOCAINE HYDROCHLORIDE 10 MG/ML
2 INJECTION, SOLUTION INFILTRATION; PERINEURAL ONCE
Status: COMPLETED | OUTPATIENT
Start: 2024-11-05 | End: 2024-11-05

## 2024-11-05 RX ORDER — TRIAMCINOLONE ACETONIDE 40 MG/ML
40 INJECTION, SUSPENSION INTRA-ARTICULAR; INTRAMUSCULAR ONCE
Status: COMPLETED | OUTPATIENT
Start: 2024-11-05 | End: 2024-11-05

## 2024-11-05 RX ORDER — BUPIVACAINE HYDROCHLORIDE 2.5 MG/ML
2 INJECTION, SOLUTION INFILTRATION; PERINEURAL ONCE
Status: COMPLETED | OUTPATIENT
Start: 2024-11-05 | End: 2024-11-05

## 2024-11-05 RX ORDER — METOPROLOL SUCCINATE 50 MG/1
50 TABLET, EXTENDED RELEASE ORAL DAILY
COMMUNITY
Start: 2023-11-29

## 2024-11-05 RX ORDER — LEVOTHYROXINE SODIUM 75 UG/1
TABLET ORAL
COMMUNITY
Start: 2024-09-06

## 2024-11-05 RX ORDER — SEMAGLUTIDE 0.68 MG/ML
0.5 INJECTION, SOLUTION SUBCUTANEOUS WEEKLY
COMMUNITY
Start: 2024-04-02 | End: 2025-04-03

## 2024-11-05 RX ORDER — ONDANSETRON 4 MG/1
TABLET, ORALLY DISINTEGRATING ORAL
COMMUNITY
Start: 2024-10-14

## 2024-11-05 RX ORDER — HYDROCHLOROTHIAZIDE 12.5 MG/1
12.5 TABLET ORAL DAILY
COMMUNITY

## 2024-11-05 RX ADMIN — TRIAMCINOLONE ACETONIDE 40 MG: 40 INJECTION, SUSPENSION INTRA-ARTICULAR; INTRAMUSCULAR at 11:53

## 2024-11-05 RX ADMIN — BUPIVACAINE HYDROCHLORIDE 5 MG: 2.5 INJECTION, SOLUTION INFILTRATION; PERINEURAL at 11:53

## 2024-11-05 RX ADMIN — LIDOCAINE HYDROCHLORIDE 2 ML: 10 INJECTION, SOLUTION INFILTRATION; PERINEURAL at 11:53

## 2025-02-12 NOTE — PROGRESS NOTES
Orthopaedic Clinic Note - Established Patient    NAME:  Radha Bains   : 1950  MRN: [id number]    2025    CHIEF COMPLAINT:  follow up right knee pain, repeat injection    HISTORY OF PRESENT ILLNESS:   The patient is a 74 y.o. female who returns today for follow up of right knee pain, requesting repeat injection. It has been 3 months since last injection. Patient denies any recent trauma, fall, or other other injury.     Past Medical History:        Diagnosis Date    Diabetes mellitus (HCC)     Hyperlipidemia     Hypertension        Past Surgical History:        Procedure Laterality Date    CARPAL TUNNEL RELEASE Left 2016    CARPAL TUNNEL RELEASE AND EXCISION OF GANGLION CYST  performed by Federico Ruiz MD at [facility] OR    CHOLECYSTECTOMY, LAPAROSCOPIC  2016    HYSTERECTOMY (CERVIX STATUS UNKNOWN)         Current Medications:   Prior to Admission medications    Medication Sig Start Date End Date Taking? Authorizing Provider   dapagliflozin (FARXIGA) 10 MG tablet Take 1 tablet by mouth Every Day   Yes ProviderCarolee MD   vitamin D (ERGOCALCIFEROL) 1.25 MG (33425 UT) CAPS capsule Take 1 capsule by mouth daily   Yes ProviderCarolee MD   glimepiride (AMARYL) 2 MG tablet Take 1 tablet by mouth daily (with breakfast)   Yes Provider, MD Carolee   hydroCHLOROthiazide 12.5 MG tablet Take 1 tablet by mouth daily   Yes aCrolee Brown MD   irbesartan (AVAPRO) 300 MG tablet Take 1 tablet by mouth nightly   Yes Carolee Brown MD   meclizine (ANTIVERT) 25 MG tablet TAKE 1 TABLET BY MOUTH 3 TIMES A DAY AS NEEDED 10/12/24  Yes Carolee Brown MD   metoprolol succinate (TOPROL XL) 50 MG extended release tablet Take 1 tablet by mouth daily 23  Yes Carolee Brown MD   ondansetron (ZOFRAN-ODT) 4 MG disintegrating tablet  10/14/24  Yes Carolee Brown MD   oxyBUTYnin (DITROPAN) 5 MG tablet  8/15/24  Yes ProviderCarolee MD   rosuvastatin

## 2025-02-26 ENCOUNTER — OFFICE VISIT (OUTPATIENT)
Age: 75
End: 2025-02-26

## 2025-02-26 VITALS — BODY MASS INDEX: 28.71 KG/M2 | WEIGHT: 156 LBS | HEIGHT: 62 IN

## 2025-02-26 DIAGNOSIS — M17.11 PRIMARY OSTEOARTHRITIS OF RIGHT KNEE: Primary | ICD-10-CM

## 2025-02-26 RX ORDER — TRIAMCINOLONE ACETONIDE 40 MG/ML
40 INJECTION, SUSPENSION INTRA-ARTICULAR; INTRAMUSCULAR ONCE
Status: COMPLETED | OUTPATIENT
Start: 2025-02-26 | End: 2025-02-26

## 2025-02-26 RX ORDER — BUPIVACAINE HYDROCHLORIDE 2.5 MG/ML
2 INJECTION, SOLUTION INFILTRATION; PERINEURAL ONCE
Status: COMPLETED | OUTPATIENT
Start: 2025-02-26 | End: 2025-02-26

## 2025-02-26 RX ORDER — LIDOCAINE HYDROCHLORIDE 10 MG/ML
2 INJECTION, SOLUTION INFILTRATION; PERINEURAL ONCE
Status: COMPLETED | OUTPATIENT
Start: 2025-02-26 | End: 2025-02-26

## 2025-02-26 RX ADMIN — LIDOCAINE HYDROCHLORIDE 2 ML: 10 INJECTION, SOLUTION INFILTRATION; PERINEURAL at 11:06

## 2025-02-26 RX ADMIN — BUPIVACAINE HYDROCHLORIDE 5 MG: 2.5 INJECTION, SOLUTION INFILTRATION; PERINEURAL at 11:06

## 2025-02-26 RX ADMIN — TRIAMCINOLONE ACETONIDE 40 MG: 40 INJECTION, SUSPENSION INTRA-ARTICULAR; INTRAMUSCULAR at 11:07

## 2025-06-03 ENCOUNTER — TRANSCRIBE ORDERS (OUTPATIENT)
Dept: ADMINISTRATIVE | Facility: HOSPITAL | Age: 75
End: 2025-06-03
Payer: MEDICARE

## 2025-06-03 ENCOUNTER — LAB (OUTPATIENT)
Dept: LAB | Facility: HOSPITAL | Age: 75
End: 2025-06-03
Payer: MEDICARE

## 2025-06-03 DIAGNOSIS — E78.2 MIXED HYPERLIPIDEMIA: ICD-10-CM

## 2025-06-03 DIAGNOSIS — Z79.4 TYPE 2 DIABETES MELLITUS WITH HYPERGLYCEMIA, WITH LONG-TERM CURRENT USE OF INSULIN: Primary | ICD-10-CM

## 2025-06-03 DIAGNOSIS — E03.9 ACQUIRED HYPOTHYROIDISM: ICD-10-CM

## 2025-06-03 DIAGNOSIS — I10 PRIMARY HYPERTENSION: ICD-10-CM

## 2025-06-03 DIAGNOSIS — Z79.4 TYPE 2 DIABETES MELLITUS WITH HYPERGLYCEMIA, WITH LONG-TERM CURRENT USE OF INSULIN: ICD-10-CM

## 2025-06-03 DIAGNOSIS — E55.9 VITAMIN D DEFICIENCY: ICD-10-CM

## 2025-06-03 DIAGNOSIS — E11.65 TYPE 2 DIABETES MELLITUS WITH HYPERGLYCEMIA, WITH LONG-TERM CURRENT USE OF INSULIN: Primary | ICD-10-CM

## 2025-06-03 DIAGNOSIS — E11.65 TYPE 2 DIABETES MELLITUS WITH HYPERGLYCEMIA, WITH LONG-TERM CURRENT USE OF INSULIN: ICD-10-CM

## 2025-06-03 LAB
25(OH)D3 SERPL-MCNC: 93.2 NG/ML (ref 30–100)
ALBUMIN SERPL-MCNC: 4.3 G/DL (ref 3.5–5.2)
ALBUMIN UR-MCNC: 2.1 MG/DL
ALBUMIN/GLOB SERPL: 1.3 G/DL
ALP SERPL-CCNC: 82 U/L (ref 39–117)
ALT SERPL W P-5'-P-CCNC: 16 U/L (ref 1–33)
ANION GAP SERPL CALCULATED.3IONS-SCNC: 12 MMOL/L (ref 5–15)
AST SERPL-CCNC: 22 U/L (ref 1–32)
BASOPHILS # BLD AUTO: 0.07 10*3/MM3 (ref 0–0.2)
BASOPHILS NFR BLD AUTO: 0.8 % (ref 0–1.5)
BILIRUB SERPL-MCNC: 0.6 MG/DL (ref 0–1.2)
BUN SERPL-MCNC: 38.6 MG/DL (ref 8–23)
BUN/CREAT SERPL: 18.8 (ref 7–25)
CALCIUM SPEC-SCNC: 9.7 MG/DL (ref 8.6–10.5)
CHLORIDE SERPL-SCNC: 99 MMOL/L (ref 98–107)
CHOLEST SERPL-MCNC: 137 MG/DL (ref 0–200)
CO2 SERPL-SCNC: 28 MMOL/L (ref 22–29)
CREAT SERPL-MCNC: 2.05 MG/DL (ref 0.57–1)
DEPRECATED RDW RBC AUTO: 43.8 FL (ref 37–54)
EGFRCR SERPLBLD CKD-EPI 2021: 24.7 ML/MIN/1.73
EOSINOPHIL # BLD AUTO: 0.38 10*3/MM3 (ref 0–0.4)
EOSINOPHIL NFR BLD AUTO: 4.3 % (ref 0.3–6.2)
ERYTHROCYTE [DISTWIDTH] IN BLOOD BY AUTOMATED COUNT: 13.1 % (ref 12.3–15.4)
GLOBULIN UR ELPH-MCNC: 3.4 GM/DL
GLUCOSE SERPL-MCNC: 153 MG/DL (ref 65–99)
HBA1C MFR BLD: 8.5 % (ref 4.8–5.6)
HCT VFR BLD AUTO: 39.2 % (ref 34–46.6)
HDLC SERPL-MCNC: 58 MG/DL (ref 40–60)
HGB BLD-MCNC: 12.5 G/DL (ref 12–15.9)
IMM GRANULOCYTES # BLD AUTO: 0.03 10*3/MM3 (ref 0–0.05)
IMM GRANULOCYTES NFR BLD AUTO: 0.3 % (ref 0–0.5)
LDLC SERPL CALC-MCNC: 51 MG/DL (ref 0–100)
LDLC/HDLC SERPL: 0.78 {RATIO}
LYMPHOCYTES # BLD AUTO: 2.79 10*3/MM3 (ref 0.7–3.1)
LYMPHOCYTES NFR BLD AUTO: 31.2 % (ref 19.6–45.3)
MCH RBC QN AUTO: 28.9 PG (ref 26.6–33)
MCHC RBC AUTO-ENTMCNC: 31.9 G/DL (ref 31.5–35.7)
MCV RBC AUTO: 90.5 FL (ref 79–97)
MONOCYTES # BLD AUTO: 0.64 10*3/MM3 (ref 0.1–0.9)
MONOCYTES NFR BLD AUTO: 7.2 % (ref 5–12)
NEUTROPHILS NFR BLD AUTO: 5.02 10*3/MM3 (ref 1.7–7)
NEUTROPHILS NFR BLD AUTO: 56.2 % (ref 42.7–76)
NRBC BLD AUTO-RTO: 0 /100 WBC (ref 0–0.2)
PLATELET # BLD AUTO: 191 10*3/MM3 (ref 140–450)
PMV BLD AUTO: 10.8 FL (ref 6–12)
POTASSIUM SERPL-SCNC: 4.1 MMOL/L (ref 3.5–5.2)
PROT SERPL-MCNC: 7.7 G/DL (ref 6–8.5)
RBC # BLD AUTO: 4.33 10*6/MM3 (ref 3.77–5.28)
SODIUM SERPL-SCNC: 139 MMOL/L (ref 136–145)
TRIGL SERPL-MCNC: 169 MG/DL (ref 0–150)
TSH SERPL DL<=0.05 MIU/L-ACNC: 1.45 UIU/ML (ref 0.27–4.2)
VLDLC SERPL-MCNC: 28 MG/DL (ref 5–40)
WBC NRBC COR # BLD AUTO: 8.93 10*3/MM3 (ref 3.4–10.8)

## 2025-06-03 PROCEDURE — 80061 LIPID PANEL: CPT

## 2025-06-03 PROCEDURE — 82306 VITAMIN D 25 HYDROXY: CPT

## 2025-06-03 PROCEDURE — 84443 ASSAY THYROID STIM HORMONE: CPT

## 2025-06-03 PROCEDURE — 83036 HEMOGLOBIN GLYCOSYLATED A1C: CPT

## 2025-06-03 PROCEDURE — 82043 UR ALBUMIN QUANTITATIVE: CPT

## 2025-06-03 PROCEDURE — 85025 COMPLETE CBC W/AUTO DIFF WBC: CPT

## 2025-06-03 PROCEDURE — 80053 COMPREHEN METABOLIC PANEL: CPT

## 2025-06-03 PROCEDURE — 36415 COLL VENOUS BLD VENIPUNCTURE: CPT

## 2025-06-11 ENCOUNTER — OFFICE VISIT (OUTPATIENT)
Age: 75
End: 2025-06-11
Payer: MEDICARE

## 2025-06-11 VITALS
SYSTOLIC BLOOD PRESSURE: 134 MMHG | HEART RATE: 65 BPM | BODY MASS INDEX: 29.45 KG/M2 | HEIGHT: 61 IN | TEMPERATURE: 98 F | DIASTOLIC BLOOD PRESSURE: 70 MMHG | WEIGHT: 156 LBS

## 2025-06-11 DIAGNOSIS — D48.9 NEOPLASM OF UNCERTAIN BEHAVIOR: Primary | ICD-10-CM

## 2025-06-11 DIAGNOSIS — Z85.820 HISTORY OF MELANOMA: ICD-10-CM

## 2025-06-11 NOTE — PROGRESS NOTES
PRIMARY CARE PROVIDER: Dk Tom DO  REFERRING PROVIDER: Dk Tom DO    Chief Complaint   Patient presents with    Suspicious Skin Lesion     Skin lesion to left eyebrow-       Subjective   History of Present Illness:  Kathryn Wray is a  74 y.o. female who presents for consultation regarding a skin lesion of the right eyebrow.  The lesion has the following characteristics:    Location: right eyebrow  Quality: won't heal, scabbing, bleeding, enlarging  Severity: mild  Duration: 6 months  Timing: constant  Modifying Factors: none  Associated Signs & Symptoms: It is not painful.    History of melanoma on her back excised by Dr. Dinh over 1 year ago.    Referred by Dr. Tom      Review of Systems:  Review of Systems   Constitutional:  Negative for chills, fatigue, fever and unexpected weight change.   HENT:  Negative for facial swelling.    Respiratory:  Negative for cough, chest tightness and shortness of breath.    Cardiovascular:  Negative for chest pain.   Musculoskeletal:  Negative for neck pain.   Skin:  Negative for color change.   Neurological:  Negative for facial asymmetry.   Hematological:  Negative for adenopathy. Does not bruise/bleed easily.       Past History:  Past Medical History:   Diagnosis Date    Arthritis     DM (diabetes mellitus)     GERD (gastroesophageal reflux disease)     HTN (hypertension)     Hypercholesteremia     Hypothyroidism     Osteopenia      Past Surgical History:   Procedure Laterality Date    CARPAL TUNNEL RELEASE      CHOLECYSTECTOMY      COLONOSCOPY  11/08/2013    COLONOSCOPY N/A 3/7/2018    Procedure: COLONOSCOPY WITH ANESTHESIA;  Surgeon: Marlene Last MD;  Location: [facility] ENDOSCOPY;  Service:     ENDOSCOPY N/A 3/7/2018    Procedure: ESOPHAGOGASTRODUODENOSCOPY WITH ANESTHESIA;  Surgeon: Marlene Last MD;  Location: [facility] ENDOSCOPY;  Service:     TOTAL ABDOMINAL HYSTERECTOMY WITH SALPINGO OOPHORECTOMY      UPPER GASTROINTESTINAL ENDOSCOPY  11/08/2013      Family History   Problem Relation Age of Onset    Breast cancer Maternal Aunt     Colon cancer Sister 51     Social History     Tobacco Use    Smoking status: Never    Smokeless tobacco: Never   Vaping Use    Vaping status: Never Used   Substance Use Topics    Alcohol use: No    Drug use: Defer     Allergies:  Patient has no known allergies.    Current Outpatient Medications:     Accu-Chek Softclix Lancets lancets, TEST 3 TIMES DAILY, Disp: 300 each, Rfl: 3    amitriptyline (ELAVIL) 100 MG tablet, , Disp: , Rfl:     amLODIPine (NORVASC) 5 MG tablet, , Disp: , Rfl:     benzonatate (TESSALON) 200 MG capsule, Take 1 capsule by mouth 2 (Two) Times a Day As Needed for Cough., Disp: 20 capsule, Rfl: 0    Blood Glucose Monitoring Suppl (ACCU-CHEK MICAELA PLUS) w/Device kit, , Disp: , Rfl:     citalopram (CeleXA) 20 MG tablet, , Disp: , Rfl:     dapagliflozin Propanediol (Farxiga) 10 MG tablet, Take 10 mg by mouth Daily., Disp: , Rfl:     Dulaglutide 1.5 MG/0.5ML solution pen-injector, Inject 1.5 mg under the skin into the appropriate area as directed 1 (One) Time Per Week., Disp: 6 mL, Rfl: 3    famotidine (PEPCID) 40 MG tablet, Take 1 tablet by mouth Daily., Disp: , Rfl:     fenofibrate (TRICOR) 145 MG tablet, Take 1 tablet by mouth Daily., Disp: , Rfl:     furosemide (LASIX) 40 MG tablet, Take 1 tablet by mouth 3 (Three) Times a Week. on Monday, Wednesday, and Friday, Disp: , Rfl:     glucose blood (Accu-Chek Guide) test strip, Use as instructed, Disp: 200 each, Rfl: 12    hydroCHLOROthiazide (HYDRODIURIL) 12.5 MG tablet, Take 1 tablet by mouth Daily., Disp: , Rfl:     HYDROcodone-acetaminophen (NORCO) 5-325 MG per tablet, Take 1 tablet by mouth Every 4 (Four) Hours As Needed for Moderate Pain., Disp: 15 tablet, Rfl: 0    insulin aspart (NovoLOG FlexPen) 100 UNIT/ML solution pen-injector sc pen, 2 up to 12 units TID before each meal, Disp: 10 pen, Rfl: 3    Insulin Glargine (Lantus SoloStar) 100 UNIT/ML injection pen,  Inject 35 Units under the skin into the appropriate area as directed Daily., Disp: 15 pen, Rfl: 4    Insulin Pen Needle (Pen Needles) 32G X 4 MM misc, 1 each 4 (Four) Times a Day. Use 4 x daily, Dx code E11.65, Disp: 360 each, Rfl: 3    irbesartan (AVAPRO) 300 MG tablet, Take 1 tablet by mouth Every Night., Disp: , Rfl:     levothyroxine (SYNTHROID, LEVOTHROID) 88 MCG tablet, , Disp: , Rfl:     ondansetron ODT (ZOFRAN-ODT) 4 MG disintegrating tablet, Place 1 tablet on the tongue Every 6 (Six) Hours As Needed for Nausea., Disp: 12 tablet, Rfl: 0    oxybutynin (DITROPAN) 5 MG tablet, , Disp: , Rfl:     phenazopyridine (PYRIDIUM) 100 MG tablet, TAKE 2 TABLETS BY MOUTH THREE TIMES DAILY AFTER MEALS FOR 2 DAYS, Disp: , Rfl:     rosuvastatin (CRESTOR) 20 MG tablet, Take 1 tablet by mouth Daily., Disp: , Rfl:     temazepam (RESTORIL) 30 MG capsule, Take 1 capsule by mouth At Night As Needed., Disp: , Rfl:     vitamin D (ERGOCALCIFEROL) 1.25 MG (52893 UT) capsule capsule, Take 1 capsule by mouth Daily., Disp: , Rfl:     Objective     Vital Signs:  Temp:  [98 °F (36.7 °C)] 98 °F (36.7 °C)  Heart Rate:  [65] 65  BP: (134)/(70) 134/70    Physical Exam:  Physical Exam  Vitals reviewed.   Constitutional:       General: She is not in acute distress.     Appearance: She is well-developed.   HENT:      Head: Normocephalic and atraumatic.        Right Ear: External ear normal.      Left Ear: External ear normal.      Mouth/Throat:      Lips: Pink.   Eyes:      General: Lids are normal.   Pulmonary:      Effort: Pulmonary effort is normal. No respiratory distress.      Breath sounds: No stridor.   Musculoskeletal:      Cervical back: Neck supple.   Neurological:      Mental Status: She is alert and oriented to person, place, and time.   Psychiatric:         Mood and Affect: Mood normal.         Speech: Speech normal.         Behavior: Behavior normal. Behavior is cooperative.           External Notes Reviewed:    REFERRAL/PRE-AUTH  MRN - SCAN - FAC PLAS REF, SHERICE MARRERO DO (05/28/2025)       Assessment   1. Neoplasm of uncertain behavior    2. History of melanoma        Plan     I have offered biopsy of the skin lesion today in the office to rule out malignancy.  The patient is in agreement with this plan.  3 mm punch biopsy was performed.  See procedure note.  Biopsy care instructions were given.    The risks, benefits, and alternatives of the procedure including but not limited to pain, scarring, bleeding, infection, persistent symptoms, and risks of the anesthesia were discussed full with the patient. Need for further therapy, depending on the pathologic outcome, was discussed. Questions were answered. No guarantees were made or implied.      She will follow-up in 2 weeks to discuss pathology results and to determine further treatment plan.    My findings and recommendations were discussed and questions were answered.     Triny Rosenthal, APRN  06/11/25  16:15 CDT

## 2025-06-11 NOTE — PROGRESS NOTES
Preprocedure diagnosis: Basal cell carcinoma versus squamous cell carcinoma of the right brow    Post procedure diagnosis: Same    Procedure: Punch biopsy    Details:  Patient consent was obtained.  The skin was cleansed with alcohol.  The skin was infiltrated with 1 mL of 1% lidocaine with 1-100,000 epinephrine.  After approximately 10 minutes, a 3 millimeter punch biopsy was taken by placing circular motion on the biopsy tool, the skin was elevated and clipped with iris scissors.  The specimen was sent in formalin.  The skin was closed using a simple 5-0 fast absorbing gut suture.  Antibiotic ointment was placed over the biopsy site.  The patient tolerated the procedure with minimal discomfort.    Triny Rosenthal, BARB  06/11/25  16:14 CDT

## 2025-06-20 LAB
CYTO UR: NORMAL
LAB AP CASE REPORT: NORMAL
LAB AP CLINICAL INFORMATION: NORMAL
LAB AP DIAGNOSIS COMMENT: NORMAL
Lab: NORMAL
PATH REPORT.FINAL DX SPEC: NORMAL
PATH REPORT.GROSS SPEC: NORMAL

## 2025-06-23 ENCOUNTER — TELEPHONE (OUTPATIENT)
Age: 75
End: 2025-06-23
Payer: MEDICARE

## 2025-06-23 NOTE — TELEPHONE ENCOUNTER
"I spoke to patient with results of her biopsy \"Bcc of brow\" BARB Rosenthal will call patient to go over options to schedule for a complete exc of the Bcc.  "

## 2025-06-25 ENCOUNTER — TELEPHONE (OUTPATIENT)
Age: 75
End: 2025-06-25
Payer: MEDICARE

## 2025-06-25 DIAGNOSIS — C44.319 BASAL CELL CARCINOMA (BCC) OF EYEBROW: Primary | ICD-10-CM

## 2025-06-25 NOTE — TELEPHONE ENCOUNTER
I spoke with the patient regarding pathology results.  Pathology demonstrated basal cell carcinoma of the right brow.    We have discussed treatment options for basal cell carcinomas.  These options include:   1) Curettage and electrodesiccation (Scraping and burning cancer cells)  2) Standard surgical resection  3) Mohs excision  4) Oral drug therapy/chemotherapy  5) Radiation therapy  6) Observation    After considering the options, the patient has elected to proceed with standard surgical resection.    I have offered excision of the basal cell carcinoma of the right brow with likely flap closure in the operating room under anesthesia.    Discussion of skin lesion. Discussed risks, benefits, alternatives, and possible complications of excision of the skin lesion with reconstruction utilizing local tissue rearrangement, full-thickness skin grafting, or local interpolated flaps. Risks include, but are not limited too: bleeding, infection, hematoma, recurrence, need for additional procedures, flap failure, cosmetic deformity. Patient understands risks and would like to proceed with surgery.  Alternatives include doing nothing.

## 2025-08-27 ENCOUNTER — PROCEDURE VISIT (OUTPATIENT)
Age: 75
End: 2025-08-27
Payer: MEDICARE

## 2025-08-27 VITALS
HEIGHT: 61 IN | RESPIRATION RATE: 20 BRPM | WEIGHT: 156 LBS | BODY MASS INDEX: 29.45 KG/M2 | HEART RATE: 80 BPM | TEMPERATURE: 98 F | SYSTOLIC BLOOD PRESSURE: 140 MMHG | DIASTOLIC BLOOD PRESSURE: 76 MMHG

## 2025-08-27 DIAGNOSIS — C44.319 BASAL CELL CARCINOMA (BCC) OF EYEBROW: Primary | ICD-10-CM

## 2025-08-27 PROCEDURE — 88331 PATH CONSLTJ SURG 1 BLK 1SPC: CPT | Performed by: OTOLARYNGOLOGY

## 2025-08-27 PROCEDURE — 88332 PATH CONSLTJ SURG EA ADD BLK: CPT | Performed by: OTOLARYNGOLOGY

## 2025-08-27 PROCEDURE — 88305 TISSUE EXAM BY PATHOLOGIST: CPT | Performed by: OTOLARYNGOLOGY

## 2025-08-28 LAB
CYTO UR: NORMAL
LAB AP CASE REPORT: NORMAL
LAB AP CLINICAL INFORMATION: NORMAL
Lab: NORMAL
Lab: NORMAL
PATH REPORT.FINAL DX SPEC: NORMAL
PATH REPORT.GROSS SPEC: NORMAL

## (undated) DEVICE — ESOPHAGEAL BALLOON DILATATION CATHETER: Brand: CRE FIXED WIRE

## (undated) DEVICE — YANKAUER,BULB TIP WITH VENT: Brand: ARGYLE

## (undated) DEVICE — ENDOGATOR AUXILIARY WATER JET CONNECTOR: Brand: ENDOGATOR

## (undated) DEVICE — CONMED SCOPE SAVER BITE BLOCK, 20X27 MM: Brand: SCOPE SAVER

## (undated) DEVICE — Device: Brand: DEFENDO AIR/WATER/SUCTION AND BIOPSY VALVE

## (undated) DEVICE — MSK O2 MD CONCENTR A/ LF 7FT 1P/U

## (undated) DEVICE — THE CHANNEL CLEANING BRUSH IS A NYLON FLEXI BRUSH ATTACHED TO A FLEXIBLE PLASTIC SHEATH DESIGNED TO SAFELY REMOVE DEBRIS FROM FLEXIBLE ENDOSCOPES.

## (undated) DEVICE — CUFF,BP,DISP,1 TUBE,ADULT,HP: Brand: MEDLINE

## (undated) DEVICE — TBG SMPL FLTR LINE NASL 02/C02 A/ BX/100

## (undated) DEVICE — DEV INFL ALLIANCE2 SYS

## (undated) DEVICE — SENSR O2 OXIMAX FNGR A/ 18IN NONSTR